# Patient Record
Sex: MALE | Race: BLACK OR AFRICAN AMERICAN | Employment: UNEMPLOYED | URBAN - METROPOLITAN AREA
[De-identification: names, ages, dates, MRNs, and addresses within clinical notes are randomized per-mention and may not be internally consistent; named-entity substitution may affect disease eponyms.]

---

## 2021-09-24 ENCOUNTER — OFFICE VISIT (OUTPATIENT)
Dept: PRIMARY CARE CLINIC | Age: 26
End: 2021-09-24
Payer: COMMERCIAL

## 2021-09-24 VITALS
HEIGHT: 67 IN | BODY MASS INDEX: 24.01 KG/M2 | TEMPERATURE: 97.9 F | DIASTOLIC BLOOD PRESSURE: 76 MMHG | WEIGHT: 153 LBS | HEART RATE: 101 BPM | OXYGEN SATURATION: 99 % | SYSTOLIC BLOOD PRESSURE: 115 MMHG
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
## 2021-09-24 DIAGNOSIS — M25.551 RIGHT HIP PAIN: ICD-10-CM

## 2021-09-24 PROCEDURE — G8420 CALC BMI NORM PARAMETERS: HCPCS | Performed by: FAMILY MEDICINE

## 2021-09-24 PROCEDURE — G8428 CUR MEDS NOT DOCUMENT: HCPCS | Performed by: FAMILY MEDICINE

## 2021-09-24 PROCEDURE — 99202 OFFICE O/P NEW SF 15 MIN: CPT | Performed by: FAMILY MEDICINE

## 2021-09-24 PROCEDURE — 4004F PT TOBACCO SCREEN RCVD TLK: CPT | Performed by: FAMILY MEDICINE

## 2021-09-24 ASSESSMENT — PATIENT HEALTH QUESTIONNAIRE - PHQ9
SUM OF ALL RESPONSES TO PHQ QUESTIONS 1-9: 0
SUM OF ALL RESPONSES TO PHQ9 QUESTIONS 1 & 2: 0
SUM OF ALL RESPONSES TO PHQ QUESTIONS 1-9: 0
1. LITTLE INTEREST OR PLEASURE IN DOING THINGS: 0
SUM OF ALL RESPONSES TO PHQ QUESTIONS 1-9: 0
2. FEELING DOWN, DEPRESSED OR HOPELESS: 0

## 2021-09-24 NOTE — PROGRESS NOTES
[address] [facility] PRIMARY CARE  [address] [place] [id number]  Dept: [phone]  Dept Fax: [phone]     9/24/2021      Merril Baumgarten   1995     Chief Complaint   Patient presents with    Fall     both legs -knee swollen/ bruising ( from jogging)       HPI  Pt comes in today for fall. Was running and doing exercises on Tuesday when he tripped and fell, landing on his knees and right side. Has abrasions to both knees. Right hip has continued to be painful. He is able to walk without difficulty. Has been out of work since Tuesday night.  at [employer]. Plans to return to work tomorrow. No data recorded       Prior to Visit Medications    Not on File        History reviewed. No pertinent past medical history. Social History     Tobacco Use    Smoking status: Current Every Day Smoker     Packs/day: 0.25     Types: Cigars    Smokeless tobacco: Never Used   Substance Use Topics    Alcohol use: Yes     Alcohol/week: 4.0 standard drinks     Types: 4 Cans of beer per week    Drug use: Never        History reviewed. No pertinent surgical history. Allergies   Allergen Reactions    Erythromycin Anaphylaxis     Throat swells         History reviewed. No pertinent family history. Patient's past medical history, surgical history, family history, medications, and allergies  were all reviewed and updated as appropriate today. Review of Systems   Constitutional: Negative for fever. Respiratory: Negative for cough and shortness of breath. Musculoskeletal: Positive for arthralgias. Negative for gait problem and joint swelling. Neurological: Negative for dizziness and syncope. /76   Pulse 101   Temp 97.9 °F (36.6 °C)   Ht 5' 7\" (1.702 m)   Wt 153 lb (69.4 kg)   SpO2 99%   BMI 23.96 kg/m²      Physical Exam  Vitals reviewed. Constitutional:       Appearance: Normal appearance. He is not ill-appearing.    Eyes:      General: No scleral icterus. Conjunctiva/sclera: Conjunctivae normal.   Cardiovascular:      Rate and Rhythm: Normal rate. Pulmonary:      Effort: Pulmonary effort is normal. No respiratory distress. Breath sounds: Normal breath sounds. Abdominal:      General: Abdomen is flat. Musculoskeletal:      Right lower leg: No edema. Left lower leg: No edema. Comments: Normal gait, right hip with normal non-painful ROM, + TTP/bruising over lateral aspect, + abrasions to BL anterior knees, healing well, no swelling   Skin:     General: Skin is warm and dry. Capillary Refill: Capillary refill takes less than 2 seconds. Neurological:      General: No focal deficit present. Mental Status: He is alert. Psychiatric:         Mood and Affect: Mood normal.         Assessment:  Encounter Diagnoses   Name Primary?  Fall from ground level Yes    Right hip pain        Plan:    - Recommended conservative management. Rest, NSAIDs, ice. Okay to return to work tomorrow. New Prescriptions    No medications on file        No orders of the defined types were placed in this encounter. Return if symptoms worsen or fail to improve. 20 total minutes were spent in direct patient care including chart review, face-to-face consultation and documentation.      [address], DO

## 2021-09-24 NOTE — PATIENT INSTRUCTIONS
Take OTC ibuprofen - 2 tablets by mouth before work and after your shift x 2-3 days. Ice your hip after work/activity. 20 minutes at a time.

## 2021-09-24 NOTE — LETTER
[address]  [address] [place] [address] Drive [address]  Phone: [phone]  Fax: [phone]    Kanu Jones DO        September 24, 2021     Patient: Kiya Ram   YOB: 1995   Date of Visit: 9/24/2021       To Whom It May Concern: It is my medical opinion that Kiya Ram may return to work on 9/25/21. If you have any questions or concerns, please don't hesitate to call.     Sincerely,        Gini Moser, DO

## 2021-09-28 ASSESSMENT — ENCOUNTER SYMPTOMS
COUGH: 0
SHORTNESS OF BREATH: 0

## 2022-09-18 ENCOUNTER — APPOINTMENT (OUTPATIENT)
Dept: CT IMAGING | Age: 27
DRG: 100 | End: 2022-09-18
Payer: MEDICAID

## 2022-09-18 ENCOUNTER — HOSPITAL ENCOUNTER (INPATIENT)
Age: 27
LOS: 2 days | Discharge: HOME | DRG: 100 | End: 2022-09-20
Attending: STUDENT IN AN ORGANIZED HEALTH CARE EDUCATION/TRAINING PROGRAM | Admitting: INTERNAL MEDICINE
Payer: MEDICAID

## 2022-09-18 ENCOUNTER — APPOINTMENT (OUTPATIENT)
Dept: MRI IMAGING | Age: 27
DRG: 100 | End: 2022-09-18
Payer: MEDICAID

## 2022-09-18 DIAGNOSIS — R56.9 SEIZURE (HCC): Primary | ICD-10-CM

## 2022-09-18 LAB
A/G RATIO: 1.6 (ref 1.1–2.2)
ALBUMIN SERPL-MCNC: 4.7 G/DL (ref 3.4–5)
ALP BLD-CCNC: 64 U/L (ref 40–129)
ALT SERPL-CCNC: 29 U/L (ref 10–40)
ANION GAP SERPL CALCULATED.3IONS-SCNC: 15 MMOL/L (ref 3–16)
AST SERPL-CCNC: 13 U/L (ref 15–37)
BASOPHILS ABSOLUTE: 0 K/UL (ref 0–0.2)
BASOPHILS RELATIVE PERCENT: 0.2 %
BILIRUB SERPL-MCNC: 0.6 MG/DL (ref 0–1)
BILIRUBIN URINE: NEGATIVE
BLOOD, URINE: NEGATIVE
BUN BLDV-MCNC: 11 MG/DL (ref 7–20)
CALCIUM SERPL-MCNC: 9.3 MG/DL (ref 8.3–10.6)
CHLORIDE BLD-SCNC: 97 MMOL/L (ref 99–110)
CLARITY: CLEAR
CO2: 25 MMOL/L (ref 21–32)
COLOR: YELLOW
CREAT SERPL-MCNC: 0.8 MG/DL (ref 0.9–1.3)
EOSINOPHILS ABSOLUTE: 0 K/UL (ref 0–0.6)
EOSINOPHILS RELATIVE PERCENT: 0.2 %
GFR AFRICAN AMERICAN: >60
GFR NON-AFRICAN AMERICAN: >60
GLUCOSE BLD-MCNC: 114 MG/DL (ref 70–99)
GLUCOSE BLD-MCNC: 125 MG/DL (ref 70–99)
GLUCOSE URINE: NEGATIVE MG/DL
HCT VFR BLD CALC: 48.7 % (ref 40.5–52.5)
HEMOGLOBIN: 16.7 G/DL (ref 13.5–17.5)
KETONES, URINE: >=80 MG/DL
LACTIC ACID: 2.2 MMOL/L (ref 0.4–2)
LEUKOCYTE ESTERASE, URINE: NEGATIVE
LYMPHOCYTES ABSOLUTE: 1 K/UL (ref 1–5.1)
LYMPHOCYTES RELATIVE PERCENT: 6.2 %
MCH RBC QN AUTO: 30.2 PG (ref 26–34)
MCHC RBC AUTO-ENTMCNC: 34.3 G/DL (ref 31–36)
MCV RBC AUTO: 87.8 FL (ref 80–100)
MICROSCOPIC EXAMINATION: ABNORMAL
MONOCYTES ABSOLUTE: 1.6 K/UL (ref 0–1.3)
MONOCYTES RELATIVE PERCENT: 10.5 %
NEUTROPHILS ABSOLUTE: 12.7 K/UL (ref 1.7–7.7)
NEUTROPHILS RELATIVE PERCENT: 82.9 %
NITRITE, URINE: NEGATIVE
PDW BLD-RTO: 13 % (ref 12.4–15.4)
PERFORMED ON: ABNORMAL
PH UA: 6.5 (ref 5–8)
PLATELET # BLD: 351 K/UL (ref 135–450)
PMV BLD AUTO: 7.2 FL (ref 5–10.5)
POTASSIUM REFLEX MAGNESIUM: 3.8 MMOL/L (ref 3.5–5.1)
PROTEIN UA: NEGATIVE MG/DL
RBC # BLD: 5.54 M/UL (ref 4.2–5.9)
SODIUM BLD-SCNC: 137 MMOL/L (ref 136–145)
SPECIFIC GRAVITY UA: 1.01 (ref 1–1.03)
TOTAL CK: 149 U/L (ref 39–308)
TOTAL PROTEIN: 7.7 G/DL (ref 6.4–8.2)
URINE REFLEX TO CULTURE: ABNORMAL
URINE TYPE: ABNORMAL
UROBILINOGEN, URINE: 2 E.U./DL
WBC # BLD: 15.4 K/UL (ref 4–11)

## 2022-09-18 PROCEDURE — 87040 BLOOD CULTURE FOR BACTERIA: CPT

## 2022-09-18 PROCEDURE — 70553 MRI BRAIN STEM W/O & W/DYE: CPT

## 2022-09-18 PROCEDURE — 93005 ELECTROCARDIOGRAM TRACING: CPT | Performed by: STUDENT IN AN ORGANIZED HEALTH CARE EDUCATION/TRAINING PROGRAM

## 2022-09-18 PROCEDURE — 6360000002 HC RX W HCPCS: Performed by: STUDENT IN AN ORGANIZED HEALTH CARE EDUCATION/TRAINING PROGRAM

## 2022-09-18 PROCEDURE — 99285 EMERGENCY DEPT VISIT HI MDM: CPT

## 2022-09-18 PROCEDURE — 96365 THER/PROPH/DIAG IV INF INIT: CPT

## 2022-09-18 PROCEDURE — 2060000000 HC ICU INTERMEDIATE R&B

## 2022-09-18 PROCEDURE — 80053 COMPREHEN METABOLIC PANEL: CPT

## 2022-09-18 PROCEDURE — 83605 ASSAY OF LACTIC ACID: CPT

## 2022-09-18 PROCEDURE — 6360000002 HC RX W HCPCS

## 2022-09-18 PROCEDURE — A9576 INJ PROHANCE MULTIPACK: HCPCS | Performed by: STUDENT IN AN ORGANIZED HEALTH CARE EDUCATION/TRAINING PROGRAM

## 2022-09-18 PROCEDURE — 70450 CT HEAD/BRAIN W/O DYE: CPT

## 2022-09-18 PROCEDURE — 85025 COMPLETE CBC W/AUTO DIFF WBC: CPT

## 2022-09-18 PROCEDURE — 82550 ASSAY OF CK (CPK): CPT

## 2022-09-18 PROCEDURE — 36415 COLL VENOUS BLD VENIPUNCTURE: CPT

## 2022-09-18 PROCEDURE — 96375 TX/PRO/DX INJ NEW DRUG ADDON: CPT

## 2022-09-18 PROCEDURE — 2580000003 HC RX 258: Performed by: STUDENT IN AN ORGANIZED HEALTH CARE EDUCATION/TRAINING PROGRAM

## 2022-09-18 PROCEDURE — 81003 URINALYSIS AUTO W/O SCOPE: CPT

## 2022-09-18 PROCEDURE — 6360000004 HC RX CONTRAST MEDICATION: Performed by: STUDENT IN AN ORGANIZED HEALTH CARE EDUCATION/TRAINING PROGRAM

## 2022-09-18 PROCEDURE — 2580000003 HC RX 258

## 2022-09-18 RX ORDER — DEXAMETHASONE SODIUM PHOSPHATE 4 MG/ML
4 INJECTION, SOLUTION INTRA-ARTICULAR; INTRALESIONAL; INTRAMUSCULAR; INTRAVENOUS; SOFT TISSUE ONCE
Status: COMPLETED | OUTPATIENT
Start: 2022-09-18 | End: 2022-09-18

## 2022-09-18 RX ORDER — ACETAMINOPHEN 325 MG/1
650 TABLET ORAL EVERY 6 HOURS PRN
Status: DISCONTINUED | OUTPATIENT
Start: 2022-09-18 | End: 2022-09-20 | Stop reason: HOSPADM

## 2022-09-18 RX ORDER — ENOXAPARIN SODIUM 100 MG/ML
40 INJECTION SUBCUTANEOUS DAILY
Status: DISCONTINUED | OUTPATIENT
Start: 2022-09-19 | End: 2022-09-20 | Stop reason: HOSPADM

## 2022-09-18 RX ORDER — POLYETHYLENE GLYCOL 3350 17 G/17G
17 POWDER, FOR SOLUTION ORAL DAILY PRN
Status: DISCONTINUED | OUTPATIENT
Start: 2022-09-18 | End: 2022-09-20 | Stop reason: HOSPADM

## 2022-09-18 RX ORDER — ONDANSETRON 4 MG/1
4 TABLET, ORALLY DISINTEGRATING ORAL EVERY 8 HOURS PRN
Status: DISCONTINUED | OUTPATIENT
Start: 2022-09-18 | End: 2022-09-20 | Stop reason: HOSPADM

## 2022-09-18 RX ORDER — SODIUM CHLORIDE 9 MG/ML
INJECTION, SOLUTION INTRAVENOUS PRN
Status: DISCONTINUED | OUTPATIENT
Start: 2022-09-18 | End: 2022-09-20 | Stop reason: HOSPADM

## 2022-09-18 RX ORDER — ACETAMINOPHEN 650 MG/1
650 SUPPOSITORY RECTAL EVERY 6 HOURS PRN
Status: DISCONTINUED | OUTPATIENT
Start: 2022-09-18 | End: 2022-09-20 | Stop reason: HOSPADM

## 2022-09-18 RX ORDER — ONDANSETRON 2 MG/ML
4 INJECTION INTRAMUSCULAR; INTRAVENOUS EVERY 6 HOURS PRN
Status: DISCONTINUED | OUTPATIENT
Start: 2022-09-18 | End: 2022-09-20 | Stop reason: HOSPADM

## 2022-09-18 RX ORDER — SODIUM CHLORIDE 0.9 % (FLUSH) 0.9 %
5-40 SYRINGE (ML) INJECTION EVERY 12 HOURS SCHEDULED
Status: DISCONTINUED | OUTPATIENT
Start: 2022-09-18 | End: 2022-09-20 | Stop reason: HOSPADM

## 2022-09-18 RX ORDER — SODIUM CHLORIDE 9 MG/ML
INJECTION, SOLUTION INTRAVENOUS CONTINUOUS
Status: ACTIVE | OUTPATIENT
Start: 2022-09-18 | End: 2022-09-18

## 2022-09-18 RX ORDER — SODIUM CHLORIDE 0.9 % (FLUSH) 0.9 %
5-40 SYRINGE (ML) INJECTION PRN
Status: DISCONTINUED | OUTPATIENT
Start: 2022-09-18 | End: 2022-09-20 | Stop reason: HOSPADM

## 2022-09-18 RX ADMIN — GADOTERIDOL 16 ML: 279.3 INJECTION, SOLUTION INTRAVENOUS at 15:03

## 2022-09-18 RX ADMIN — SODIUM CHLORIDE: 9 INJECTION, SOLUTION INTRAVENOUS at 18:49

## 2022-09-18 RX ADMIN — DEXAMETHASONE SODIUM PHOSPHATE 4 MG: 4 INJECTION, SOLUTION INTRAMUSCULAR; INTRAVENOUS at 13:55

## 2022-09-18 RX ADMIN — CEFTRIAXONE 2000 MG: 2 INJECTION, POWDER, FOR SOLUTION INTRAMUSCULAR; INTRAVENOUS at 18:55

## 2022-09-18 RX ADMIN — SODIUM CHLORIDE 1500 MG: 900 INJECTION, SOLUTION INTRAVENOUS at 14:09

## 2022-09-18 RX ADMIN — SODIUM CHLORIDE, PRESERVATIVE FREE 10 ML: 5 INJECTION INTRAVENOUS at 21:09

## 2022-09-18 ASSESSMENT — ENCOUNTER SYMPTOMS
CONSTIPATION: 0
BACK PAIN: 0
ABDOMINAL PAIN: 0
DIARRHEA: 0
NAUSEA: 0
SHORTNESS OF BREATH: 0
BLOOD IN STOOL: 0
PHOTOPHOBIA: 1
RECTAL PAIN: 0
SORE THROAT: 0
VOMITING: 0
EYE PAIN: 0
SINUS PRESSURE: 1
APNEA: 0
PHOTOPHOBIA: 0

## 2022-09-18 ASSESSMENT — PAIN SCALES - GENERAL: PAINLEVEL_OUTOF10: 7

## 2022-09-18 NOTE — ED NOTES
Pt back from MRI. No change in condition. Pt remains stable. Resident physician at bedside at this time speaking with pt.      Sukhwinder Bentley RN  09/18/22 [id number]

## 2022-09-18 NOTE — PROGRESS NOTES
4 Eyes Admission Assessment     I agree as the admission nurse that 2 RN's have performed a thorough Head to Toe Skin Assessment on the patient. ALL assessment sites listed below have been assessed on admission. Areas assessed by both nurses:   [x]   Head, Face, and Ears   [x]   Shoulders, Back, and Chest  [x]   Arms, Elbows, and Hands   [x]   Coccyx, Sacrum, and Ischium  [x]   Legs, Feet, and Heels        Does the Patient have Skin Breakdown?   No         Richard Prevention initiated:  NA   Wound Care Orders initiated:  NA      WOC nurse consulted for Pressure Injury (Stage 3,4, Unstageable, DTI, NWPT, and Complex wounds) or Richard score 18 or lower:  NA      Nurse 1 eSignature: Electronically signed by Felix Zavaleta RN on 9/18/22 at 5:58 PM EDT    **SHARE this note so that the co-signing nurse is able to place an eSignature**    Nurse 2 eSignature: Electronically signed by Varsha Guzman RN on 9/18/22 at 6:07 PM EDT

## 2022-09-18 NOTE — H&P
Internal Medicine  PGY 1  History & Physical      CC seizure    History Obtained From:  patient    HISTORY OF PRESENT ILLNESS:  Pt is a 26yo M with no relevant PMHx who presents with new onset seizure this morning. Pt states he was laying down in his bed and having a conversation with his girlfriend and son. Mid-conversation, patient's eyes rolled back into his head and he started foaming at the mouth. He was making arm movements as if he was grabbing a water bottle. This episode lasted about 2 to 3 minutes. Pt describes post-ictal state as sleepy, however he could hear his girlfriend calling 911. He was able to get up and out of bed, get dressed, and get on the ambulance. Pt states he has had sinus congestion, HA, and L ear pain over the last 5 days with associated dizziness and photophobia. He has had poor PO intake over the last 1 week, however he is drinking broth/soups and lots of water. He has been taking Tylenol and CVC brand acetaminophen over the last 3 days. Additionally, he bought lavendar essential oil and ear drops on [employer], which he has used since yesterday. He describes a possible seizure 4 days ago during which he was sleeping on the couch and his girlfriend heard a loud sound, and found the patient still sleeping but with blood under his nose. He does not remember this episode. Denies fever, chills, sweats, vision changes, sore throat, CP, SOB, abd pain, N/V, peripheral edema. In the ED, pt was worked up for seizure. He was given decadron 4mg x1 and Keppra 1.5g BID. CT head significant for \"Multifocal areas of presumed edema with linear hemorrhagic staining throughout the cerebrum. \" Neurosurgery was consulted who recommended MRI head w and wo, which was significant for \"hyperintensity in the right frontal lobe with associated pachymeningeal enhancement as well as subtle regions of T2 hyperintensity in the inferior bilateral temporal lobes\" likely acute inflammatory or infectious process.  GCS 15. WBC elevated to 15. Glucose 125. Labs otherwise unremarkable. Past Medical History:    No past medical history on file. Past Surgical History:    No past surgical history on file. Medications Priorto Admission:    Not in a hospital admission. Allergies:  Erythromycin    Social History:   TOBACCO:   reports that he has been smoking cigars and cigarettes. He has been smoking an average of .25 packs per day. He has never used smokeless tobacco.  ETOH:  for the last several months, drank 3-4 bottles of beer daily. Stopped drinking 1 week ago. DRUGS : medical marijuana for osteoarthritis  Patient currently lives with family, girlfriend and 11mo old son    Family History:   No family history on file. Review of Systems   Constitutional:  Negative for appetite change (decreased), chills and fever. HENT:  Positive for ear pain (L ear) and sinus pressure. Negative for sore throat. Eyes:  Positive for photophobia. Negative for pain. Respiratory:  Negative for shortness of breath. Cardiovascular:  Negative for chest pain and palpitations. Gastrointestinal:  Negative for abdominal pain, constipation, diarrhea, nausea and vomiting. Genitourinary:  Negative for difficulty urinating. Musculoskeletal:  Negative for neck pain and neck stiffness. Skin:  Negative for rash. Neurological:  Positive for dizziness, seizures and headaches. Physical Exam  Constitutional:       General: He is not in acute distress. Appearance: Normal appearance. He is not ill-appearing. HENT:      Head: Normocephalic and atraumatic. Mouth/Throat:      Mouth: Mucous membranes are moist.   Eyes:      Pupils: Pupils are equal, round, and reactive to light. Cardiovascular:      Rate and Rhythm: Normal rate and regular rhythm. Pulmonary:      Effort: Pulmonary effort is normal.      Breath sounds: Normal breath sounds. Abdominal:      General: Abdomen is flat.  Bowel sounds are normal. There is no laminar necrosis. Neoplastic process is felt to be less likely. CT Head W/O Contrast   Final Result      Multifocal areas of presumed edema with linear hemorrhagic staining throughout the cerebrum. Further evaluation with contrast-enhanced MRI as warranted. Findings discussed with Dr. Chani Leiva in the emergency room on 9/18/2022 at [phone]. ASSESSMENT AND PLAN:  Pt is a 24yo M with no relevant PMHx who presents with new onset seizure this morning with sx of sinus congestion, HA, and L ear pain over the last 5 days with associated dizziness and photophobia. He is admitted for management of seizure. New onset seizure  Pt with new seizure this AM. No focal residual deficits and GCS 15 at presentation. Given recent infectious sx of L ear pain, congestion, HA, considering infectious encephalitis. Meningitis less likely given negative Kernig's sign. Considering medication induced as pt tried new ear drops from . CT head: Multifocal areas of presumed edema with linear hemorrhagic staining throughout the cerebrum. MRI head w wo: hyperintensity in the right frontal lobe with associated pachymeningeal enhancement as well as subtle regions of T2 hyperintensity in the inferior bilateral temporal lobes; Differential diagnosis is broad and favored to represent an acute inflammatory or infectious process such as cerebritis, encephalitis.   - Q4hr neuro checks  - cont 500mg keppra BID IV  - neurology consulted - appreciate recs  - considering LP?  - neurosurgery consulted - appreciate recs  - f/u UA, urine culture, UDS  - f/u lactate  - trend CK daily x 3 days  - pain control with Tylenol    Will discuss with attending physician Dr. Jennifer Myers Status:Full code  FEN: NPO  PPX: SCDs  DISPO: Nya Umanzor MD PGY1  9/18/2022,  4:00 PM

## 2022-09-18 NOTE — PROGRESS NOTES
Patient transferred and oriented to room 5511. Pt denies pain, nausea, and photophobia at this time.  Siderails up x4, bed wheels locked

## 2022-09-18 NOTE — PROGRESS NOTES
Original order for Acyclovir 10 mg per kg (=750 mg based on actual body wt = 73 kg) IV q8h, changed to 10 mg per kg (=650 mg based on ideal body wt = 66 kg) IV q8h per renal dosing protocol. Use ideal body weight or adjusted body weight for weight-based dosing in patients with obesity to avoid overdosing and subsequent toxicity (eg, acute renal failure).   Wicho BallOP 9/18/2022 5:18 PM

## 2022-09-18 NOTE — ED NOTES
Provider at bedside updating pt and significant other at this time.      Patt Jacobs RN  09/18/22 4037

## 2022-09-18 NOTE — ED PROVIDER NOTES
[address] [facility]          ATTENDING PHYSICIAN NOTE       Date of evaluation: 9/18/2022    Chief Complaint     Seizures      History of Present Illness     Chuy Greco is a 31 y.o. male with no significant underlying medical history who presents to the hospital today for evaluation of a seizure. The patient states that he had a seizure about an hour prior to arrival when he was sleeping. He states that he has otherwise been dealing with some sinus congestion and left-sided ear pain for the past 5 days. He has been taking over-the-counter medications and some lavender oil for his symptoms. He denies any other new medications. He has never had seizures before and denies any family history of seizures. Patient denies any recent fever. He has not had any chest pain abdominal pain, nausea vomiting. Patient denies any lateral symptoms and endorses a mild headache at this time. He last took some Tylenol for his headache around 9 AM today. He denies any photophobia or migraine-like symptoms at this time. Review of Systems     Review of Systems   HENT:  Negative for congestion and ear discharge. Eyes:  Negative for photophobia and visual disturbance. Respiratory:  Negative for apnea. Cardiovascular:  Negative for chest pain. Gastrointestinal:  Negative for abdominal pain, blood in stool, nausea, rectal pain and vomiting. Endocrine: Negative for polydipsia. Genitourinary:  Negative for difficulty urinating. Musculoskeletal:  Negative for back pain. Skin:  Negative for pallor. Neurological:  Positive for seizures. Negative for dizziness and syncope. Hematological:  Negative for adenopathy. All other systems reviewed and are negative. Past Medical, Surgical, Family, and Social History     He has no past medical history on file. He has no past surgical history on file. His family history is not on file.   He reports that he has been smoking cigars and cigarettes. He has been smoking an average of .25 packs per day. He has never used smokeless tobacco. He reports current drug use. Drug: Marijuana Charmayne Stai). Medications     Previous Medications    No medications on file       Allergies     He is allergic to erythromycin. Physical Exam     INITIAL VITALS: BP: (!) 129/101, Temp: 98.6 °F (37 °C), Heart Rate: 60, Resp: 18, SpO2: 99 %   Physical Exam  Vitals reviewed. Constitutional:       General: He is not in acute distress. Appearance: Normal appearance. He is well-developed. He is not ill-appearing, toxic-appearing or diaphoretic. HENT:      Head: Normocephalic and atraumatic. Nose: Nose normal.      Mouth/Throat:      Mouth: Mucous membranes are moist.   Eyes:      Pupils: Pupils are equal, round, and reactive to light. Cardiovascular:      Rate and Rhythm: Normal rate and regular rhythm. Heart sounds: Normal heart sounds. Pulmonary:      Effort: Pulmonary effort is normal.      Breath sounds: Normal breath sounds. Abdominal:      General: Bowel sounds are normal. There is no distension. Palpations: Abdomen is soft. Tenderness: There is no abdominal tenderness. Musculoskeletal:         General: No swelling, tenderness or deformity. Normal range of motion. Cervical back: Neck supple. Skin:     General: Skin is warm and dry. Capillary Refill: Capillary refill takes less than 2 seconds. Neurological:      General: No focal deficit present. Mental Status: He is alert and oriented to person, place, and time. Cranial Nerves: No cranial nerve deficit. Sensory: No sensory deficit. Motor: No weakness. Coordination: Coordination normal.      Gait: Gait normal.      Deep Tendon Reflexes: Reflexes normal.   Psychiatric:         Mood and Affect: Mood normal.       Diagnostic Results     EKG   Indication seizure.     RADIOLOGY:  CT Head W/O Contrast   Final Result      Multifocal areas of presumed edema with linear hemorrhagic staining throughout the cerebrum. Further evaluation with contrast-enhanced MRI as warranted. Findings discussed with Dr. Chani Leiva in the emergency room on 9/18/2022 at [phone]. MRI BRAIN W WO CONTRAST    (Results Pending)       LABS:   Results for orders placed or performed during the hospital encounter of 09/18/22   CBC with Auto Differential   Result Value Ref Range    WBC 15.4 (H) 4.0 - 11.0 K/uL    RBC 5.54 4.20 - 5.90 M/uL    Hemoglobin 16.7 13.5 - 17.5 g/dL    Hematocrit 48.7 40.5 - 52.5 %    MCV 87.8 80.0 - 100.0 fL    MCH 30.2 26.0 - 34.0 pg    MCHC 34.3 31.0 - 36.0 g/dL    RDW 13.0 12.4 - 15.4 %    Platelets 206 940 - 424 K/uL    MPV 7.2 5.0 - 10.5 fL    Neutrophils % 82.9 %    Lymphocytes % 6.2 %    Monocytes % 10.5 %    Eosinophils % 0.2 %    Basophils % 0.2 %    Neutrophils Absolute 12.7 (H) 1.7 - 7.7 K/uL    Lymphocytes Absolute 1.0 1.0 - 5.1 K/uL    Monocytes Absolute 1.6 (H) 0.0 - 1.3 K/uL    Eosinophils Absolute 0.0 0.0 - 0.6 K/uL    Basophils Absolute 0.0 0.0 - 0.2 K/uL   CMP w/ Reflex to MG   Result Value Ref Range    Sodium 137 136 - 145 mmol/L    Potassium reflex Magnesium 3.8 3.5 - 5.1 mmol/L    Chloride 97 (L) 99 - 110 mmol/L    CO2 25 21 - 32 mmol/L    Anion Gap 15 3 - 16    Glucose 114 (H) 70 - 99 mg/dL    BUN 11 7 - 20 mg/dL    Creatinine 0.8 (L) 0.9 - 1.3 mg/dL    GFR Non-African American >60 >60    GFR African American >60 >60    Calcium 9.3 8.3 - 10.6 mg/dL    Total Protein 7.7 6.4 - 8.2 g/dL    Albumin 4.7 3.4 - 5.0 g/dL    Albumin/Globulin Ratio 1.6 1.1 - 2.2    Total Bilirubin 0.6 0.0 - 1.0 mg/dL    Alkaline Phosphatase 64 40 - 129 U/L    ALT 29 10 - 40 U/L    AST 13 (L) 15 - 37 U/L   POCT Glucose   Result Value Ref Range    POC Glucose 125 (H) 70 - 99 mg/dl    Performed on ACCU-CHEK        ED BEDSIDE ULTRASOUND:  No results found.     RECENT VITALS:  BP: 135/89,Temp: 98.6 °F (37 °C), Heart Rate: 57, Resp: 17, SpO2: 100 %     Procedures       ED Course Nursing Notes, Past Medical Hx, Past Surgical Hx, Social Hx,Allergies, and Family Hx were reviewed. ED Course as of 09/18/22 1417   Sun Sep 18, 2022   4998 The patient significant other came back and stated that he was talking with her when he started foaming out of his mouth and his eyes rolled back and had a generalized tonic-clonic seizure for about a minute. The patient was postictal after the event happened. Here in the department he has some mild leukocytosis but no significant neutrophilia and his CMP shows no evidence of electrolyte derangements. His glucose was 125 in the department. [EI]      ED Course User Index  [EI] Gerson Diaz MD       patient was given the following medications:  Orders Placed This Encounter   Medications    dexamethasone (DECADRON) injection 4 mg    levETIRAcetam (KEPPRA) 1,500 mg in sodium chloride 0.9 % 100 mL IVPB       CONSULTS:  IP CONSULT TO NEUROSURGERY  IP CONSULT TO HOSPITALIST    MEDICAL DECISIONMAKING / ASSESSMENT / Heribertoministerio Layne is a 31 y.o. male with no significant seizure history presenting with a new onset seizure. Patient has findings suggestive of vasogenic edema throughout his cerebrum with hemorrhagic streaking that is appreciated and is new. Given this he was given steroids and antiepileptic medications. I discussed his case with neurosurgery who agrees with MRI and admission to the hospitalist.  The patient this time has no obvious focal deficits and will be admitted to the hospital.      Clinical Impression     1. Seizure ([address]        Disposition     PATIENT REFERRED TO:  No follow-up provider specified.     DISCHARGE MEDICATIONS:  New Prescriptions    No medications on file       DISPOSITION Decision To Admit 09/18/2022 02:06:51 PM         Gerson Diaz MD  09/18/22 1413

## 2022-09-18 NOTE — ED NOTES
Pt to MRI via wheelchair after Keppra infusion completed. NO change in condition.      Herbert Norton RN  09/18/22 5440

## 2022-09-18 NOTE — ED TRIAGE NOTES
Pt to ed from home for reported by girlfriend he had a seizure this am. Pt states he has been taking over the counter meds for vertigo recently but does not know the names.

## 2022-09-18 NOTE — ED NOTES
Pt continues resting in bed at this time. No change in condition. Seizure precautions remain in place. Call light remains in reach.      Celeste Brink RN  09/18/22 [id number]

## 2022-09-19 ENCOUNTER — APPOINTMENT (OUTPATIENT)
Dept: GENERAL RADIOLOGY | Age: 27
DRG: 100 | End: 2022-09-19
Payer: MEDICAID

## 2022-09-19 LAB
ANION GAP SERPL CALCULATED.3IONS-SCNC: 12 MMOL/L (ref 3–16)
APPEARANCE CSF: CLEAR
BASOPHILS ABSOLUTE: 0.1 K/UL (ref 0–0.2)
BASOPHILS RELATIVE PERCENT: 0.7 %
BUN BLDV-MCNC: 10 MG/DL (ref 7–20)
CALCIUM SERPL-MCNC: 9.4 MG/DL (ref 8.3–10.6)
CHLORIDE BLD-SCNC: 99 MMOL/L (ref 99–110)
CLOT EVALUATION CSF: ABNORMAL
CO2: 29 MMOL/L (ref 21–32)
COLOR CSF: YELLOW
CREAT SERPL-MCNC: 0.9 MG/DL (ref 0.9–1.3)
EKG ATRIAL RATE: 64 BPM
EKG DIAGNOSIS: NORMAL
EKG P AXIS: 67 DEGREES
EKG P-R INTERVAL: 118 MS
EKG Q-T INTERVAL: 416 MS
EKG QRS DURATION: 102 MS
EKG QTC CALCULATION (BAZETT): 429 MS
EKG R AXIS: -19 DEGREES
EKG T AXIS: 40 DEGREES
EKG VENTRICULAR RATE: 64 BPM
EOS CSF: 2 %
EOSINOPHILS ABSOLUTE: 0.1 K/UL (ref 0–0.6)
EOSINOPHILS RELATIVE PERCENT: 0.6 %
GFR AFRICAN AMERICAN: >60
GFR NON-AFRICAN AMERICAN: >60
GLUCOSE BLD-MCNC: 95 MG/DL (ref 70–99)
GLUCOSE, CSF: 65 MG/DL (ref 40–80)
HAV IGM SER IA-ACNC: NORMAL
HCT VFR BLD CALC: 44.1 % (ref 40.5–52.5)
HEMOGLOBIN: 15.3 G/DL (ref 13.5–17.5)
HEPATITIS B CORE IGM ANTIBODY: NORMAL
HEPATITIS B SURFACE ANTIGEN INTERPRETATION: NORMAL
HEPATITIS C ANTIBODY INTERPRETATION: NORMAL
INR BLD: 1.05 (ref 0.87–1.14)
LYMPHOCYTES ABSOLUTE: 2.3 K/UL (ref 1–5.1)
LYMPHOCYTES RELATIVE PERCENT: 18.5 %
LYMPHS CSF: 45 % (ref 40–80)
MACROPHAGE, CSF: 19 %
MCH RBC QN AUTO: 30.2 PG (ref 26–34)
MCHC RBC AUTO-ENTMCNC: 34.8 G/DL (ref 31–36)
MCV RBC AUTO: 87 FL (ref 80–100)
MENINGITIS ENCEPHALITIS PANEL: NORMAL
MONOCYTES ABSOLUTE: 1.7 K/UL (ref 0–1.3)
MONOCYTES RELATIVE PERCENT: 13.5 %
NEUTROPHILS ABSOLUTE: 8.4 K/UL (ref 1.7–7.7)
NEUTROPHILS RELATIVE PERCENT: 66.7 %
NEUTROPHILS, CSF: 34 % (ref 0–6)
PDW BLD-RTO: 12.8 % (ref 12.4–15.4)
PLATELET # BLD: 312 K/UL (ref 135–450)
PMV BLD AUTO: 7.2 FL (ref 5–10.5)
POTASSIUM REFLEX MAGNESIUM: 4.3 MMOL/L (ref 3.5–5.1)
PROTEIN CSF: 53 MG/DL (ref 15–45)
PROTHROMBIN TIME: 13.7 SEC (ref 11.7–14.5)
RBC # BLD: 5.07 M/UL (ref 4.2–5.9)
RBC CSF: 1700 /CUMM
REPORT: NORMAL
SODIUM BLD-SCNC: 140 MMOL/L (ref 136–145)
TOTAL CK: 134 U/L (ref 39–308)
TUBE NUMBER CSF: ABNORMAL
TUBE NUMBER CSF: NORMAL
VOLUME CSF: 1 ML
WBC # BLD: 12.6 K/UL (ref 4–11)
WBC CSF: 26 /CUMM (ref 0–5)

## 2022-09-19 PROCEDURE — 84157 ASSAY OF PROTEIN OTHER: CPT

## 2022-09-19 PROCEDURE — 95819 EEG AWAKE AND ASLEEP: CPT

## 2022-09-19 PROCEDURE — 2500000003 HC RX 250 WO HCPCS

## 2022-09-19 PROCEDURE — 80048 BASIC METABOLIC PNL TOTAL CA: CPT

## 2022-09-19 PROCEDURE — 82550 ASSAY OF CK (CPK): CPT

## 2022-09-19 PROCEDURE — 87390 HIV-1 AG IA: CPT

## 2022-09-19 PROCEDURE — 62328 DX LMBR SPI PNXR W/FLUOR/CT: CPT

## 2022-09-19 PROCEDURE — 009U3ZX DRAINAGE OF SPINAL CANAL, PERCUTANEOUS APPROACH, DIAGNOSTIC: ICD-10-PCS | Performed by: RADIOLOGY

## 2022-09-19 PROCEDURE — 6360000002 HC RX W HCPCS

## 2022-09-19 PROCEDURE — 82945 GLUCOSE OTHER FLUID: CPT

## 2022-09-19 PROCEDURE — 86701 HIV-1ANTIBODY: CPT

## 2022-09-19 PROCEDURE — 85025 COMPLETE CBC W/AUTO DIFF WBC: CPT

## 2022-09-19 PROCEDURE — 85610 PROTHROMBIN TIME: CPT

## 2022-09-19 PROCEDURE — 2580000003 HC RX 258

## 2022-09-19 PROCEDURE — APPNB180 APP NON BILLABLE TIME > 60 MINS

## 2022-09-19 PROCEDURE — 2060000000 HC ICU INTERMEDIATE R&B

## 2022-09-19 PROCEDURE — 87070 CULTURE OTHR SPECIMN AEROBIC: CPT

## 2022-09-19 PROCEDURE — 87483 CNS DNA AMP PROBE TYPE 12-25: CPT

## 2022-09-19 PROCEDURE — 86780 TREPONEMA PALLIDUM: CPT

## 2022-09-19 PROCEDURE — 36415 COLL VENOUS BLD VENIPUNCTURE: CPT

## 2022-09-19 PROCEDURE — 80074 ACUTE HEPATITIS PANEL: CPT

## 2022-09-19 PROCEDURE — 86702 HIV-2 ANTIBODY: CPT

## 2022-09-19 PROCEDURE — 89050 BODY FLUID CELL COUNT: CPT

## 2022-09-19 PROCEDURE — 6370000000 HC RX 637 (ALT 250 FOR IP)

## 2022-09-19 PROCEDURE — 87205 SMEAR GRAM STAIN: CPT

## 2022-09-19 RX ORDER — LIDOCAINE HYDROCHLORIDE 10 MG/ML
5 INJECTION, SOLUTION INFILTRATION; PERINEURAL ONCE
Status: DISCONTINUED | OUTPATIENT
Start: 2022-09-19 | End: 2022-09-19 | Stop reason: CLARIF

## 2022-09-19 RX ADMIN — ACYCLOVIR SODIUM 650 MG: 50 INJECTION, SOLUTION INTRAVENOUS at 11:04

## 2022-09-19 RX ADMIN — SODIUM CHLORIDE: 9 INJECTION, SOLUTION INTRAVENOUS at 13:57

## 2022-09-19 RX ADMIN — CEFTRIAXONE 2000 MG: 2 INJECTION, POWDER, FOR SOLUTION INTRAMUSCULAR; INTRAVENOUS at 17:29

## 2022-09-19 RX ADMIN — SODIUM CHLORIDE: 9 INJECTION, SOLUTION INTRAVENOUS at 01:38

## 2022-09-19 RX ADMIN — LIDOCAINE HYDROCHLORIDE 5 ML: 10 INJECTION, SOLUTION INFILTRATION; PERINEURAL at 09:54

## 2022-09-19 RX ADMIN — SODIUM CHLORIDE 1000 MG: 9 INJECTION, SOLUTION INTRAVENOUS at 01:39

## 2022-09-19 RX ADMIN — ACYCLOVIR SODIUM 650 MG: 50 INJECTION, SOLUTION INTRAVENOUS at 02:20

## 2022-09-19 RX ADMIN — ACYCLOVIR SODIUM 650 MG: 50 INJECTION, SOLUTION INTRAVENOUS at 18:07

## 2022-09-19 RX ADMIN — ACETAMINOPHEN 650 MG: 325 TABLET, FILM COATED ORAL at 18:42

## 2022-09-19 RX ADMIN — SODIUM CHLORIDE 1000 MG: 9 INJECTION, SOLUTION INTRAVENOUS at 13:58

## 2022-09-19 RX ADMIN — SODIUM CHLORIDE: 9 INJECTION, SOLUTION INTRAVENOUS at 02:20

## 2022-09-19 RX ADMIN — CEFTRIAXONE 2000 MG: 2 INJECTION, POWDER, FOR SOLUTION INTRAMUSCULAR; INTRAVENOUS at 05:30

## 2022-09-19 ASSESSMENT — PAIN SCALES - GENERAL
PAINLEVEL_OUTOF10: 5
PAINLEVEL_OUTOF10: 0

## 2022-09-19 NOTE — PROGRESS NOTES
Physician Progress Note      PATIENT:               Nanda Garcia  CSN #:                  [id number]  :                       1995  ADMIT DATE:       2022 12:06 PM  DISCH DATE:  RESPONDING  PROVIDER #:        Pam Loera          QUERY TEXT:    Pt admitted with presumed seizure. Per CT Head, pt noted to have vasogenic   edema and T2/FLAIR hyperintensity in the right frontal lobe with associated   pachymeningeal enhancement. If clinically significant, please document in   progress notes and discharge summary if you are evaluating/treating any of the   following: The medical record reflects the following:  Risk Factors: 31 y.o. male with no significant PMH  Clinical Indicators: Presented with new onset seizure associated with   dizziness and photophobia  Treatment: Neurology consult, Imaging, One time dose of decadron in ED,   Keppra, q 4 hr neuro checks  Options provided:  -- Cerebral edema  -- Cerebral edema ruled out  -- Other - I will add my own diagnosis  -- Disagree - Not applicable / Not valid  -- Disagree - Clinically unable to determine / Unknown  -- Refer to Clinical Documentation Reviewer    PROVIDER RESPONSE TEXT:    This patient has cerebral edema.     Query created by: Yee Keating on 2022 3:51 PM      Electronically signed by:  Pam Loera 2022 4:26 PM

## 2022-09-19 NOTE — PROGRESS NOTES
Pt A&Ox4, VSS on room air. No acute neuro changes at this time. No new seizure activity this shift. Pt is up as tolerated, SBA. Voiding adequately via BRP. Pt adequately eating and drinking. Pt complains of dizziness upon standing. All fall and seizure precautions in place. No further needs at this time. Will continue to monitor.

## 2022-09-19 NOTE — PROGRESS NOTES
cerebrum. Neurosurgery was consulted and recommended an MRI of his head with and without contrast.  This study notable for significant hyperintensity in the right frontal lobe with associated pachymeningeal enhancement as well as subtle regions of T2 hyperintensity of the inferior bilateral temporal lobes. These results are likely acute inflammatory infectious process. Terry Coma Scale in the ED was 15. White blood cell abated 15. Glucose at 125. Labs otherwise unremarkable. Medications:     Scheduled Meds:   sodium chloride flush  5-40 mL IntraVENous 2 times per day    enoxaparin  40 mg SubCUTAneous Daily    levETIRAcetam  1,000 mg IntraVENous Q12H    cefTRIAXone (ROCEPHIN) IV  2,000 mg IntraVENous Q12H    acyclovir  10 mg/kg (Ideal) IntraVENous Q8H     Continuous Infusions:   sodium chloride 25 mL/hr at 09/19/22 0220     PRN Meds:sodium chloride flush, sodium chloride, ondansetron **OR** ondansetron, polyethylene glycol, acetaminophen **OR** acetaminophen    Objective:   Vitals:   T-max:  Patient Vitals for the past 8 hrs:   BP Temp Temp src Pulse Resp SpO2   09/19/22 0643 120/78 97.9 °F (36.6 °C) Oral 59 16 98 %   09/19/22 0215 113/67 98.7 °F (37.1 °C) Oral 59 16 97 %       Intake/Output Summary (Last 24 hours) at 9/19/2022 9761  Last data filed at 9/18/2022 1426  Gross per 24 hour   Intake 100 ml   Output --   Net 100 ml       Physical Exam  Constitutional:       General: He is awake. He is not in acute distress. Appearance: He is normal weight. Eyes:      Extraocular Movements: Extraocular movements intact. Cardiovascular:      Rate and Rhythm: Normal rate and regular rhythm. Pulses:           Radial pulses are 2+ on the right side and 2+ on the left side. Heart sounds: Normal heart sounds, S1 normal and S2 normal. No murmur heard. Pulmonary:      Effort: Pulmonary effort is normal.      Breath sounds: Normal breath sounds and air entry. Abdominal:      General: Abdomen is flat. Palpations: Abdomen is soft. Tenderness: There is no abdominal tenderness. Musculoskeletal:      Right lower leg: No edema. Left lower leg: No edema. Neurological:      Mental Status: He is alert and oriented to person, place, and time. Psychiatric:         Behavior: Behavior is cooperative. LABS:    CBC:   Recent Labs     09/18/22  1250 09/19/22  0607   WBC 15.4* 12.6*   HGB 16.7 15.3   HCT 48.7 44.1    312   MCV 87.8 87.0     Renal:    Recent Labs     09/18/22  1250 09/19/22  0606    140   K 3.8 4.3   CL 97* 99   CO2 25 29   BUN 11 10   CREATININE 0.8* 0.9   GLUCOSE 114* 95   CALCIUM 9.3 9.4   ANIONGAP 15 12     Hepatic:   Recent Labs     09/18/22  1250   AST 13*   ALT 29   BILITOT 0.6   PROT 7.7   LABALBU 4.7   ALKPHOS 64     Troponin: No results for input(s): TROPONINI in the last 72 hours. BNP: No results for input(s): BNP in the last 72 hours. Lipids: No results for input(s): CHOL, HDL in the last 72 hours. Invalid input(s): LDLCALCU, TRIGLYCERIDE  ABGs:  No results for input(s): PHART, ITQ1EKH, PO2ART, OFR4MAB, BEART, THGBART, S2UARTVX, RSI2LDZ in the last 72 hours. INR:   Recent Labs     09/19/22  0607   INR 1.05     Lactate: No results for input(s): LACTATE in the last 72 hours. Cultures:  -----------------------------------------------------------------  RAD:   MRI BRAIN W WO CONTRAST   Final Result   Addendum (preliminary) 1 of 1   ADDENDUM #1    Upon review of the CT images, there is small amount of intracranial gas    along the inner table of the left temporal bone. There is subtle fracture    associated with the squamous portion of the left temporal bone. Up-to-date findings discussed with Dr. Rea Mclaughlin in the emergency room on    9/18/2022 at [phone].       Final   Impression:       T2/FLAIR hyperintensity in the right frontal lobe with associated pachymeningeal enhancement as well as subtle regions of T2 hyperintensity in the inferior bilateral temporal lobes. Differential diagnosis is broad and favored to represent an acute inflammatory or infectious process such as cerebritis, encephalitis. Ischemic process is felt to be less likely but there is some susceptibility artifact in the region of interest that could represent some amount of cortical laminar necrosis. Neoplastic process is felt to be less likely. CT Head W/O Contrast   Final Result      Multifocal areas of presumed edema with linear hemorrhagic staining throughout the cerebrum. Further evaluation with contrast-enhanced MRI as warranted. Findings discussed with Dr. Praful Varma in the emergency room on 9/18/2022 at [phone]. FL LUMBAR PUNCTURE DIAG    (Results Pending)         Assessment/Plan:   Deidra New is a 70-year-old male with no pertinent past medical history who presents to the ED with new onset seizures and imaging showing hyperintensity of the inferior bilateral temporal lobes concerning for infection versus inflammatory neurological process. New- Onset Seizures:  Ceftriaxone 2g  Acyclovir  Every 4 hour neurochecks  1g Keppra twice daily IV  Neurology consulted-appreciate recommendations  Will have LP with CSF studies- IR completed study, will follow-up on results. Neurosurgery consulted, appreciate recommendations.   Follow-up urinalysis, urine culture, urine drug screen  Follow-up lactate  Trend CK daily x3 days  Pain control with Tylenol as needed  UDS  Decadron 4mg x1    Code Status:Full Code  FEN: Diet NPO  PPX:  Lovenox  DISPO: VALERIE Koenig MD  PGY1, Internal Medicine  09/19/22  8:41 AM      This patient will be staffed and discussed with Katie Campos MD.

## 2022-09-19 NOTE — PLAN OF CARE
Problem: Pain  Goal: Verbalizes/displays adequate comfort level or baseline comfort level  Outcome: Progressing  Note: Pt. Managing pain per MAR. Problem: Safety - Adult  Goal: Free from fall injury  Outcome: Progressing  Note: Pt. Free from falls this shift. All fall precautions in place and call light is within reach.

## 2022-09-19 NOTE — CONSULTS
Neurology / Neurocritical Care Consult Note    Pao Landis MD is requesting this consult. Reason for Consult: new onset seizure  Admission Chief Complaint: new seizure    History of Present Illness     Stu Patterson is a 31 y.o. y/o male with no significant PMH. Per my interview with the patient and his significant other he had a possible seizure on 9/14. Patient's significant other has a video on where you hear a thump on the audio, and then witness the patient stumble into view and sit on the couch, shake his head and try to re-orient himself. Patient denies any memory of this event. On the morning of 9/18, patient's girlfriend he had another possible seizure with a semiology of shaking to his BUE, eyes rolling to the back of his head, and foaming at the mouth for ~5min. The patient does not remember this second event either. EMS was called and patient was taken to [facility] ED. Patient c/o a \"clogged L ear\", dizziness, headache, and photophobia on arrival to ED. CT head showed multifocal areas of possible edema with hemorrhagic staining and a subtle L temporal fracture. MRI head w wo contrast showed hyperintensity on T2 in the anterior R frontal lobe w/out mass effect suspicious for acute inflammatory or infectious process. WBC count 15.4 in ED. Patient afebrile on arrival.  Admitted to  for further workup. REVIEW OF SYSTEMS:   Constitutional- No weight loss or fevers   Eyes- No diplopia. C/o photophobia. Ears/nose/throat- No dysphagia. No Dysarthria. \"C/o plugged L ear\"   Cardiovascular- No palpitations. No chest pain   Respiratory- No dyspnea. No Cough   Gastrointestinal- No Abdominal pain. No Vomiting. Genitourinary- No incontinence. No urinary retention   Musculoskeletal- No myalgia. No arthralgia   Skin- No rash. No easy bruising. Psychiatric- No depression. No anxiety   Endocrine- No diabetes. No thyroid issues. Hematologic- No bleeding difficulty.  No fatigue   Neurologic- C/o dizziness when going from sitting to standing, photophobia, and L temporal headache rated at 5/10. Past Medical, Surgical, Family, and Social History   PAST MEDICAL HISTORY:  No past medical history on file. SURGICAL HISTORY:  No past surgical history on file. FAMILY HISTORY & SOCIAL HISTORY:  Family history non-contributory  No family history on file. Social History     Tobacco Use    Smoking status: Every Day     Packs/day: 0.25     Types: Cigars, Cigarettes    Smokeless tobacco: Never   Substance Use Topics    Drug use: Yes     Types: Marijuana Elva Ilia)     Comment: occ         Allergies & Outpatient Medications   ALLERGIES:  Allergies   Allergen Reactions    Erythromycin Anaphylaxis     Throat swells      HOME MEDICATIONS:  There are no discharge medications for this patient.         Physical Exam   PHYSICAL EXAM:  Vitals:    09/18/22 1630 09/18/22 2315 09/19/22 0215 09/19/22 0643   BP: 126/79 126/73 113/67 120/78   Pulse: 63 64 59 59   Resp: 14 16 16 16   Temp: 98.6 °F (37 °C) 98.5 °F (36.9 °C) 98.7 °F (37.1 °C) 97.9 °F (36.6 °C)   TempSrc: Oral Oral Oral Oral   SpO2: 99% 96% 97% 98%   Weight:       Height:             General: Alert, no distress, well-nourished  Neurologic  Mental status:   orientation to person, place, time, situation   Attention intact as able to attend well to the exam     Language fluent in conversation   Comprehension intact; follows simple commands    Cranial nerves:   CN2: Visual fields full w/o extinction on confrontational testing   CN 3,4,6: Pupils equal and reactive to light, extraocular muscles intact  CN5: Facial sensation symmetric   CN7: Face symmetric  CN8: Hearing symmetric to spoken voice  CN9: Palate elevated symmetrically  CN11: Traps full strength on shoulder shrug  CN12: Tongue midline with protrusion    Motor Exam:   R  L    Deltoid 5  5   Biceps 5 5   Triceps 5 5   Wrist extension  5 5   Interossei 5 5      R  L    Hip flexion  5  5   Hip extension  5 5   Knee flexion  5 5   Knee extension  5 5   Ankle dorsiflexion  5 5   Ankle plantar flexion  5 5       Sensory: light touch intact and symmetric in all 4 extremities. No sensory extinction on bilateral simultaneous stimulation  Cerebellar/coordination: finger nose finger normal without ataxia  Tone: normal in all 4 extremities  Gait: held 2/2 patient safety      OTHER SYSTEMS:  Cardiovascular: Warm, appears well perfused   Respiratory: Easy, non-labored respiratory pattern   Abdominal: Abdomen is without distention   Extremities: Upper and lower extremities are atraumatic in appearance without deformity. No swelling or erythema. Diagnostic Testing Results   IMAGES:  Images personally reviewed and agree w/ radiology interpretation. Head CT w/o Contrast:  Impression       Multifocal areas of presumed edema with linear hemorrhagic staining throughout the cerebrum. Further evaluation with contrast-enhanced MRI as warranted. ADDENDUM #1    Upon review of the CT images, there is small amount of intracranial gas    along the inner table of the left temporal bone. There is subtle fracture    associated with the squamous portion of the left temporal bone. MRI Brain w & w/o Contrast:  Impression   Impression:    T2/FLAIR hyperintensity in the right frontal lobe with associated pachymeningeal enhancement as well as subtle regions of T2 hyperintensity in the inferior bilateral temporal lobes. Differential diagnosis is broad and favored to represent an acute inflammatory or infectious process such as cerebritis, encephalitis. Ischemic process is felt to be less likely but there is some susceptibility artifact in the region of interest that could represent some amount of cortical laminar necrosis. Neoplastic process is felt to be less likely. LABS:  All results below personally reviewed. Pertinent positives & negatives are addressed in Impression & Recommendations below.      LABS   Metabolic Panel Recent Labs     09/18/22  1250 09/19/22  0606    140   K 3.8 4.3   CL 97* 99   CO2 25 29   BUN 11 10   CREATININE 0.8* 0.9   GLUCOSE 114* 95   CALCIUM 9.3 9.4   LABALBU 4.7  --    ALKPHOS 64  --    ALT 29  --    AST 13*  --       CBC / Coags Recent Labs     09/18/22  1250 09/19/22  0607   WBC 15.4* 12.6*   RBC 5.54 5.07   HGB 16.7 15.3   HCT 48.7 44.1    312   INR  --  1.05      Other No results for input(s): LABA1C, LDLCALC, TRIG, TSH, FIQAUOOU99, FOLATE, LABSALI, COVID19 in the last 72 hours. Recent Labs     09/18/22  1729   LACTA 2.2*          CURRENT SCHEDULED MEDICATIONS   Inpatient Medications     sodium chloride flush, 5-40 mL, IntraVENous, 2 times per day    enoxaparin, 40 mg, SubCUTAneous, Daily    levETIRAcetam, 1,000 mg, IntraVENous, Q12H    cefTRIAXone (ROCEPHIN) IV, 2,000 mg, IntraVENous, Q12H    acyclovir, 10 mg/kg (Ideal), IntraVENous, Q8H   Infusions    sodium chloride 25 mL/hr at 09/19/22 0220      Antibiotics   Recent Abx Admin                     cefTRIAXone (ROCEPHIN) 2,000 mg in dextrose 5 % 50 mL IVPB mini-bag (mg) 2,000 mg New Bag 09/19/22 0530     2,000 mg New Bag 09/18/22 1855    acyclovir (ZOVIRAX) 650 mg in dextrose 5 % 100 mL IVPB (mg) 650 mg New Bag 09/19/22 0220                       IMPRESSION & RECOMMENDATIONS     IMPRESSION:  Edilia Puckett is a 30 yo male who presents with seizures (\"BUE shaking, foaming at the mouth, and eyes rolling back in his head\") on 9/14 and 9/18 with a possible fall on 9/14. Patient c/o headache, dizziness when standing, photophobia, and L ear feeling \"clogged\". CT head and MRI head w and wo show area of possible inflammation or infection vs. Traumatic contusion. WBC on arrival to [facility] ED was 15.4, but patient has been afebrile.        RECOMMENDATIONS:  - continue acyclovir and ceftriaxone until meningitis panel results  - continue keppra 1g BID  - lumbar puncture completed, results pending  - q4h neuro checks  - routine EEG  - Activity / Diet as tolerates  - SBP <180  - Hold antiplts / full dose anticoagulants x 2 weeks  - PT/OT for dizziness / imbalance  - SLP for cognitive evaluation   - Headaches: Tylenol 650mg PO Q6H PRN  - Dizziness: Meclizine 25mg PO TID PRN  - Nausea: Zofran 4mg IV Q6H PRN. Consider scopolamine patch for persistent nausea  - Will require 24 hour supervision for first 48 hours after discharge  - Mild TBI education prior to discharge   - follow up with outpatient neurology on discharge  - driving restriction for 3 months        DELIA Wynn - CNP   Neurology & Neurocritical Care   Neurology Line: [phone]  [employer]: [facility] NPs  9/19/2022 9:07 AM    I spent 75 minutes in the care of this patient. Over 50% of that time was in face-to-face counseling regarding disease process, diagnostic testing, preventative measures, and answering patient and family questions.

## 2022-09-19 NOTE — PLAN OF CARE
Problem: Discharge Planning  Goal: Discharge to home or other facility with appropriate resources  Outcome: Progressing     Problem: Pain  Goal: Verbalizes/displays adequate comfort level or baseline comfort level  9/19/2022 0750 by aWrren Gilmore RN  Outcome: Progressing   Pt endorsing no pain at this time. Pain being managed with prn and scheduled pain medication per the [facility] with some relief. Pt using rest, repositioning and distraction as non-pharm measures to decrease pain and promote comfort. Problem: Safety - Adult  Goal: Free from fall injury  9/19/2022 0750 by Warren Gilmore RN  Outcome: Progressing   All fall precautions in place. Bed locked and in lowest position with alarm on. Overbed table and personal belonings within reach. Call light within reach and patient instructed to use call light for assistance. Non-skid socks on.

## 2022-09-19 NOTE — CONSULTS
NEUROSURGERY CONSULT NOTE    Venecia Sanches  [id number]   1995   9/19/2022    Requesting physician: Suad Cooper MD    Reason for consultation: new onset seizure    History of present illness: Patient is a 31 y.o. male w/ no significant PMH . Patient and his significant other stated he had a possible seizure on 9/14. Patient's significant other has a video on where you hear a thump on the audio, and then witness the patient stumble into view and sit on the couch, shaking his head. Patient has no memory of this event. On the morning of 9/18, patient's girlfriend stated he had another possible seizure with shaking to his BUE, eyes rolling to the back of his head, and foaming at the mouth for approximately 5 min. The patient has no memory of this second event either. EMS was called and patient was taken to [facility] ED. Patient c/o dizziness, headache, and photophobia on arrival to ED. CT head showed multifocal areas of possible edema with hemorrhagic staining and a subtle L temporal fracture. MRI head w wo contrast showed hyperintensity on T2 in the anterior R frontal lobe w/out mass effect suspicious for contusion. Probable subacute. This would fit if pt fell on 9/14. ROS:   GENERAL:  Denies fever or recent illness. Denies weight changes   EYES:  Denies vision change or diplopia + photophobia  EARS:  Denies hearing loss. Plugged L ear  CARDIAC:  Denies chest pain  RESPIRATORY:  Denies shortness of breath  SKIN:  Denies rash or lesions   HEM:  Denies excessive bruising  PSYCH:  Denies anxiety or depression  NEURO:  + headache and dizziness , numbness or tingling or lateralizing weakness   :  Denies urinary difficulty  GI: Denies nausea, vomiting, diarrhea or constipation  MUSCULOSKELETAL:  No arthralgias    Allergies   Allergen Reactions    Erythromycin Anaphylaxis     Throat swells        No past medical history on file. No past surgical history on file. Social History     Occupational History    Not on file   Tobacco Use    Smoking status: Every Day     Packs/day: 0.25     Types: Cigars, Cigarettes    Smokeless tobacco: Never   Substance and Sexual Activity    Alcohol use: Not on file     Comment: occ    Drug use: Yes     Types: Marijuana [place])     Comment: occ    Sexual activity: Yes     Partners: Female        No family history on file. No outpatient medications have been marked as taking for the 9/18/22 encounter [facility] Encounter).         Current Facility-Administered Medications   Medication Dose Route Frequency Provider Last Rate Last Admin    sodium chloride flush 0.9 % injection 5-40 mL  5-40 mL IntraVENous 2 times per day Coty Jamil MD   10 mL at 09/18/22 2109    sodium chloride flush 0.9 % injection 5-40 mL  5-40 mL IntraVENous PRN Coty Jamil MD        0.9 % sodium chloride infusion   IntraVENous PRN Coty Jamil MD 20 mL/hr at 09/19/22 1357 New Bag at 09/19/22 1357    enoxaparin (LOVENOX) injection 40 mg  40 mg SubCUTAneous Daily Coty Jamil MD        ondansetron (ZOFRAN-ODT) disintegrating tablet 4 mg  4 mg Oral Q8H PRN Coty Jamil MD        Or    ondansetron (ZOFRAN) injection 4 mg  4 mg IntraVENous Q6H PRN Coty Jamil MD        polyethylene glycol (GLYCOLAX) packet 17 g  17 g Oral Daily PRN Coty Jamil MD        acetaminophen (TYLENOL) tablet 650 mg  650 mg Oral Q6H PRN Coty Jamil MD        Or    acetaminophen (TYLENOL) suppository 650 mg  650 mg Rectal Q6H PRN Coty Jamil MD        levETIRAcetam (KEPPRA) 1,000 mg in sodium chloride 0.9 % 100 mL IVPB  1,000 mg IntraVENous Q12H Coty Jamil MD   Stopped at 09/19/22 1448    cefTRIAXone (ROCEPHIN) 2,000 mg in dextrose 5 % 50 mL IVPB mini-bag  2,000 mg IntraVENous Q12H Coty Jamil MD   Stopped at 09/19/22 0643    acyclovir (ZOVIRAX) 650 mg in dextrose 5 % 100 mL IVPB  10 mg/kg (Ideal) IntraVENous Q8H Josy Jackson Tomeka Escobar MD   Stopped at 09/19/22 1222        Objective:  /75   Pulse 65   Temp 99.5 °F (37.5 °C) (Oral)   Resp 16   Ht 5' 7.01\" (1.702 m)   Wt 161 lb (73 kg)   SpO2 97%   BMI 25.21 kg/m²     Physical Exam:   Patient seen and examined  GCS:  4 - Opens eyes on own  5 - Alert and oriented  6 - Follows simple motor commands  General: Well developed. Alert and cooperative in no acute distress. HENT: atraumatic, neck supple  Eyes: Optic discs: Not tested  Pulmonary: unlabored respiratory effort  Cardiovascular:  Warm well perfused. No peripheral edema  Gastrointestinal: abdomen soft, NT, ND    Neurological:  Mental Status: Awake, alert, oriented x 4, speech clear and appropriate  Attention: Intact  Language: No aphasia or dysarthria noted  Sensation: Intact to all extremities to light touch  Coordination: Intact  DTRs:     Cranial Nerves:  II: Visual acuity not tested, denies new visual changes / diplopia  III, IV, VI: PERRL, 3 mm bilaterally, EOMI, no nystagmus noted  V: Facial sensation intact bilaterally to touch  VII: Face symmetric  VIII: Hearing intact bilaterally to spoken voice  IX: Palate movement equal bilaterally  XI: Shoulder shrug equal bilaterally  XII: Tongue midline    Musculoskeletal:   Gait: Not tested   Assist devices: None   Tone: normal  Motor strength:    Right  Left    Right  Left    Deltoid  5 5  Hip Flex  5 5   Biceps  5 5  Knee Extensors  5 5   Triceps  5 5  Knee Flexors  5 5   Wrist Ext  5 5  Ankle Dorsiflex. 5 5   Wrist Flex  5 5  Ankle Plantarflex. 5 5   Handgrip  5 5       Thumb Ext  5 5         Radiological Findings:   CT head      Multifocal areas of presumed edema with linear hemorrhagic staining throughout the cerebrum. Further evaluation with contrast-enhanced MRI as warranted. MRI brain  T2/FLAIR hyperintensity in the right frontal lobe with associated pachymeningeal enhancement as well as subtle regions of T2 hyperintensity in the inferior bilateral temporal lobes. Differential diagnosis is broad and favored to represent an acute inflammatory or infectious process such as cerebritis, encephalitis. Ischemic process is felt to be less likely but there is some susceptibility artifact in the region of interest that could represent some amount of cortical laminar necrosis. Neoplastic process is felt to be less likely. Labs:  Recent Labs     09/19/22  0607   WBC 12.6*   HGB 15.3   HCT 44.1          Recent Labs     09/19/22  0606      K 4.3   CL 99   CO2 29   BUN 10   CREATININE 0.9   GLUCOSE 95   CALCIUM 9.4       Recent Labs     09/19/22  0607   PROTIME 13.7   INR 1.05       Patient Active Problem List    Diagnosis Date Noted    Seizure ([facility] [address] 09/18/2022       Assessment:  Patient is a 31 y.o. male with new onset seizure. Most likely from fall. CT and MRI show a fracture of L temporal bone which would be consistent with trauma like a fall. His CT shows contusions that are consisten with trauma most likely sub acute which would fit from fall on 9/14. Plan:  No neurosurgical intervention indicated   Neuro checks Q4H  Neuro Critical care consulted for seizure  DVT Prophylaxis: SCD's  Pain: Managed by medical team  PT/OT consulted, appreciate recs  Advance diet / activity per primary team  Will follow. Please call with any questions or decline in neurological status    DISPO: Dispo timing to be determined by primary team once patient is medically stable for discharge. Patient was seen and examined with Dr. Iliana Sanchez who agrees with above assessment and plan. Electronically signed by:  DELIA Martins CNP, APRN-CNP, 9/19/2022 4:03 PM  [phone]

## 2022-09-19 NOTE — PROGRESS NOTES
Comprehensive Nutrition Assessment    Type and Reason for Visit:  Initial, Positive Nutrition Screen    Nutrition Recommendations/Plan:   Continue Regular diet  Monitor nutrition adequacy, pertinent labs, bowel habits, wt changes, and clinical progress     Malnutrition Assessment:  Malnutrition Status: At risk for malnutrition (Comment) (09/19/22 7098)    Context:  Acute Illness     Findings of the 6 clinical characteristics of malnutrition:  Energy Intake:  Mild decrease in energy intake (Comment) (Prior to admission)  Weight Loss:  No significant weight loss (Pt reports UBW of 170#, unsure of timeline)     Body Fat Loss:  No significant body fat loss     Muscle Mass Loss:  No significant muscle mass loss    Fluid Accumulation:  No significant fluid accumulation      Nutrition Assessment:    Positive nutrition screen: Pt admitted after seizure. On a Regular diet, has has consumed ~50% of his dinner last night and lunch today. Pt reports that 5 days prior to admission he had no appetite and did not eat for 4 days. The day prior to being admitted he drank soup and had no issues w/ N/V or abdominal pain. His appetite is starting to come back now and he reports no issues w/ N/V. He also stated that he is beginning to feel hungry again. Pt reports UBW of #170. Will continue to monitor. Nutrition Related Findings:    Labs reviewed. Wound Type: None       Current Nutrition Intake & Therapies:    Average Meal Intake: 26-50%, 51-75%  Average Supplements Intake: None Ordered  ADULT DIET; Regular    Anthropometric Measures:  Height: 5' 7.01\" (170.2 cm)  Ideal Body Weight (IBW): 148 lbs (67 kg)       Current Body Weight: 160 lb 15 oz (73 kg), 108.7 % IBW. Weight Source: Bed Scale  Current BMI (kg/m2): 25.2        Weight Adjustment For: No Adjustment                 BMI Categories: Overweight (BMI 25.0-29. 9)      Nutrition Diagnosis:   Inadequate oral intake related to inadequate protein-energy intake as evidenced by poor intake prior to admission    Nutrition Interventions:   Food and/or Nutrient Delivery: Continue Current Diet  Nutrition Education/Counseling: Education not indicated  Coordination of Nutrition Care: Continue to monitor while inpatient  Plan of Care discussed with: Pt    Goals:  Previous Goal Met: Progressing toward Goal(s)  Goals: PO intake 75% or greater, prior to discharge       Nutrition Monitoring and Evaluation:   Behavioral-Environmental Outcomes: None Identified  Food/Nutrient Intake Outcomes: Food and Nutrient Intake  Physical Signs/Symptoms Outcomes: Biochemical Data, Nutrition Focused Physical Findings, Weight, Nausea or Vomiting    Discharge Planning:    Continue current diet     Paris Winter RD, LD  Contact: [phone]

## 2022-09-19 NOTE — CARE COORDINATION
[address] Case Management Assessment [facility] - . [id number]    Case Management Assessment  Initial Evaluation    Date/Time of Evaluation: 9/19/2022 12:38 PM  Assessment Completed by: Darrick Conley RN    If patient is discharged prior to next notation, then this note serves as note for discharge by case management. Patient Name: Adolfo Mancilla                   YOB: 1995  Diagnosis: Seizure [facility]) [R56.9]                   Date / Time: 9/18/2022 12:06 PM    Patient Admission Status: Inpatient     Current PCP: Ivy Pichardo, DO  PCP verified by CM? No    Chart Reviewed: Yes      Patient Interviewed: Yes   Family Interviewed:  No   Patient Orientation: Alert and Oriented    Patient Cognition: Alert  History Provided by: Patient    Hospitalization in the last 30 days (Readmission):  No    If yes, Readmission Assessment in  Navigator will be completed. Advance Directives:     Code Status: Full Code       Discharge Planning  Patient lives with: Spouse/Significant Other, Children Type of Home: House  Primary Care Giver: Self  Patient Support Systems include: Spouse/Significant Other, Children   Current Financial resources:    Current community resources:    Current services prior to admission: None   Type of Home Care services:  None    ADLS  Prior functional level: Independent in ADLs/IADLs  Current functional level: Independent in ADLs/IADLs    PT AM-PAC:   /24  OT AM-PAC:   /24    Family can provide assistance at DC: Yes  Would you like Case Management to discuss the discharge plan with any other family members/significant others, and if so, who?  No  Plans to Return to Present Housing: Yes  Other Identified Issues/Barriers to RETURNING to current housing:   Potential Assistance needed at discharge: N/A  Patient expects to discharge to: [address] [place] [address] for transportation at discharge: Family    Financial  Payor: /     Does insurance require precert for SNF: Yes    Potential assistance Purchasing Medications: No  Meds-to-Beds request: Yes    No Pharmacies Listed    Factors facilitating achievement of predicted outcomes: Family support    Barriers to discharge: Medical complications    Additional Case Management Notes: pt from home with girl friend and 9 month old son. No services used at home. Girl friend to drive home at dc. No needs anticipated from  at this time. The Plan for Transition of Care is related to the following treatment goals of Seizure ([facility] [address] [S47.6]    IF APPLICABLE: The Patient and/or patient representative [facility] and his family were provided with a choice of provider and agrees with the discharge plan. Freedom of choice list with basic dialogue that supports the patient's individualized plan of care/goals and shares the quality data associated with the providers was provided to: Patient   Patient Representative Name:       The Patient and/or Patient Representative Agree with the Discharge Plan?  Yes    Che Mcknight RN  Case Management Department  Ph: [phone] Fax: [phone]

## 2022-09-19 NOTE — DISCHARGE INSTRUCTIONS
Seizure Driving Risk: Having a Seizure while driving puts you at risk for injuring yourself or others. If you drive while having uncontrolled seizures, you may be held liable for injuries to others. Do not drive until you have been seizure free for at least 3 months, state laws vary so please check laws in your state. Injury Risk: Please avoid working from [employer], swimming alone or taking baths in a bathtub, use showers only. Mild Traumatic Brain Injury (TBI)/Concussion    What is Traumatic Brain Injury? Traumatic Brain Injury (TBI) is an injury to the brain caused by a blow or jolt to the head from blunt or penetrating trauma. The injury that occurs at the moment of impact is known as the primary injury. Primary injuries can involve a specific part of the brain or the entire brain. Immediately after the accident the person may be confused, not remember what happened, have blurry vision, dizziness, or lose consciousness. What are the symptoms? Depending on the type and location of the injury, the person's symptoms may include: Loss of consciousness   Confusion and disorientation  Memory loss / amnesia   Fatigue   Headaches   Visual problems   Poor attention / concentration   Sleep disturbances   Dizziness / loss of balance   Irritability / emotional disturbances   Feelings of depression   Seizures  Vomiting    Treatment:  Patients with a Mild TBI usually do not require surgery. They generally need rest and sometimes may need medications to help relieve headaches. Recovery: After a mild brain injury, patients can have a wide variation of symptoms. Some patients may not experience any symptoms while others can have severe symptoms with headaches, dizziness, memory problems, sleep disorders, fatigue, changing emotions, and/or seizures. Next is a description of the common problems people experience after a mild brain injury:  Headaches:   Headaches are a very common problem after a Mild TBI.  Most patients with a mild TBI will experience headaches. Over the counter acetaminophen is the best medication to treat your headaches. In most patients, headaches will go away within 1-2 weeks and they should gradually improve with time. If your headache were to worsen or become severe and unrelieved by pain medication, then call your healthcare provider. Dizziness:  Dizziness is another very common symptom. The dizziness should improve with time; however, if it becomes severe and makes it difficult to complete tasks, please contact your health care provider to discuss options for treating these symptoms. Memory problems:  Memory problems are another common problem among patients with a Mild TBI. This can range from problems with organizing tasks to problems remembering names or the grocery list. This is called your short-term memory. Generally, these memory problems are mild and will resolve over time, however some patients may need to get help from a speech therapist for more severe or persistent symptoms. A speech therapist can help with ways to organize and provide tips to practice on improving your short-term memory. Sleep disorders / Fatigue:  Patients with a mild TBI can also have problems with sleep and fatigue. Initially after a head injury patient will feel tired, need frequent rest periods, and may want to sleep most of the time for the first several days. It is important to increase your activity level gradually every day. Increasing your activity to include light exercise will help your symptoms to improve more quickly. When going back to work it is important to take this into account. You might need to work part-time for the first week to build up your endurance before going back full-time. Like all other symptoms with a mild TBI this will diminish with time. Emotions:  After a mild TBI some patients have trouble controlling their emotions.  This means they might get mad or angry in a situation where they normally wouldn't. Sometimes patients say they cry easily, even at a sad commercial on TV. This can be a huge adjustment and can cause strain on relationships. Caregivers or spouses may be more likely to notice these subtle changes in the patient's personality. If these symptoms are persistent contacting the patient's physician to get a neuropsychological evaluation may identify specific cognitive areas that were weakened or changed by the TBI and help provide direction for further care. Seizures:   Seizures can occur anytime the brain is injured. Patients with a mild TBI are often not given seizure medications because they have a low risk of having a seizure, however at the discretion of the medical team some patients may be put on medication to prevent seizures from occurring for a short period of time after the injury. Prevention  Tips to reduce the risk for a head injury:  Always wear your helmet when riding a bicycle, motorcycle, skateboard, or all-terrain vehicle. Never drive under the influence of alcohol or drugs. Always wear your seat belt and ensure that children are secured in the appropriate child safety seats. Avoid falls in the home by keeping unsecured items off the floor, installing safety features such as non-slip mats in the bathtub, handrails on stairways, and keeping items off of stairs. Avoid falls by participating in an exercise program to increase strength, balance, and coordination. Store firearms in a locked cabinet with bullets in a separate location. Wear protective headgear while playing sports. When should I return to the emergency room? After a patient is discharged home, they will need constant supervision for the next 24-48 hours from a loved one to watch for changes. They need to return to the emergency room if the following issues occur:   If the patient is unable to be awoken from sleep  If the patient has new onset nausea and vomiting or nausea and vomiting that is unable to be controlled with medication  If the patient becomes confused or disoriented  If the patient develops weakness on one side of their body  If the patient develops difficultly talking or understanding what is being said  If the patient has a seizure    Resources: The Brain Injury Association:  www.biaofela. [facility]. org

## 2022-09-19 NOTE — PROGRESS NOTES
Pt. Oriented x4. VSS on RA. No acute neuro changes overnight. Pt. States he has some weakness and dizziness while ambulating, denies nausea and pain. Pt. Voiding well, SBA to bathroom. All fall and seizure precautions in place and call light is within reach.

## 2022-09-20 VITALS
WEIGHT: 161 LBS | RESPIRATION RATE: 16 BRPM | HEIGHT: 67 IN | BODY MASS INDEX: 25.27 KG/M2 | HEART RATE: 68 BPM | OXYGEN SATURATION: 98 % | SYSTOLIC BLOOD PRESSURE: 110 MMHG | TEMPERATURE: 98.5 F | DIASTOLIC BLOOD PRESSURE: 65 MMHG

## 2022-09-20 LAB
ANION GAP SERPL CALCULATED.3IONS-SCNC: 13 MMOL/L (ref 3–16)
BASOPHILS ABSOLUTE: 0.1 K/UL (ref 0–0.2)
BASOPHILS RELATIVE PERCENT: 0.7 %
BUN BLDV-MCNC: 10 MG/DL (ref 7–20)
CALCIUM SERPL-MCNC: 9.1 MG/DL (ref 8.3–10.6)
CHLORIDE BLD-SCNC: 98 MMOL/L (ref 99–110)
CO2: 24 MMOL/L (ref 21–32)
CREAT SERPL-MCNC: 0.7 MG/DL (ref 0.9–1.3)
EOSINOPHILS ABSOLUTE: 0.1 K/UL (ref 0–0.6)
EOSINOPHILS RELATIVE PERCENT: 0.9 %
GFR AFRICAN AMERICAN: >60
GFR NON-AFRICAN AMERICAN: >60
GLUCOSE BLD-MCNC: 111 MG/DL (ref 70–99)
HCT VFR BLD CALC: 44.1 % (ref 40.5–52.5)
HEMOGLOBIN: 15.5 G/DL (ref 13.5–17.5)
HIV AG/AB: NORMAL
HIV ANTIGEN: NORMAL
HIV-1 ANTIBODY: NORMAL
HIV-2 AB: NORMAL
LYMPHOCYTES ABSOLUTE: 1.7 K/UL (ref 1–5.1)
LYMPHOCYTES RELATIVE PERCENT: 15 %
MCH RBC QN AUTO: 30.6 PG (ref 26–34)
MCHC RBC AUTO-ENTMCNC: 35.2 G/DL (ref 31–36)
MCV RBC AUTO: 86.7 FL (ref 80–100)
MONOCYTES ABSOLUTE: 1.4 K/UL (ref 0–1.3)
MONOCYTES RELATIVE PERCENT: 12.4 %
NEUTROPHILS ABSOLUTE: 8.2 K/UL (ref 1.7–7.7)
NEUTROPHILS RELATIVE PERCENT: 71 %
PDW BLD-RTO: 12.9 % (ref 12.4–15.4)
PLATELET # BLD: 339 K/UL (ref 135–450)
PMV BLD AUTO: 7 FL (ref 5–10.5)
POTASSIUM REFLEX MAGNESIUM: 4 MMOL/L (ref 3.5–5.1)
RBC # BLD: 5.09 M/UL (ref 4.2–5.9)
SODIUM BLD-SCNC: 135 MMOL/L (ref 136–145)
TOTAL CK: 96 U/L (ref 39–308)
TOTAL SYPHILLIS IGG/IGM: NORMAL
WBC # BLD: 11.5 K/UL (ref 4–11)

## 2022-09-20 PROCEDURE — 97116 GAIT TRAINING THERAPY: CPT

## 2022-09-20 PROCEDURE — 97165 OT EVAL LOW COMPLEX 30 MIN: CPT

## 2022-09-20 PROCEDURE — 97161 PT EVAL LOW COMPLEX 20 MIN: CPT

## 2022-09-20 PROCEDURE — 97535 SELF CARE MNGMENT TRAINING: CPT

## 2022-09-20 PROCEDURE — 80048 BASIC METABOLIC PNL TOTAL CA: CPT

## 2022-09-20 PROCEDURE — 6360000002 HC RX W HCPCS

## 2022-09-20 PROCEDURE — 97530 THERAPEUTIC ACTIVITIES: CPT

## 2022-09-20 PROCEDURE — 92523 SPEECH SOUND LANG COMPREHEN: CPT

## 2022-09-20 PROCEDURE — 97167 OT EVAL HIGH COMPLEX 60 MIN: CPT

## 2022-09-20 PROCEDURE — 85025 COMPLETE CBC W/AUTO DIFF WBC: CPT

## 2022-09-20 PROCEDURE — 2580000003 HC RX 258

## 2022-09-20 PROCEDURE — 82550 ASSAY OF CK (CPK): CPT

## 2022-09-20 PROCEDURE — 36415 COLL VENOUS BLD VENIPUNCTURE: CPT

## 2022-09-20 RX ORDER — LEVETIRACETAM 500 MG/1
1000 TABLET ORAL 2 TIMES DAILY
Qty: 360 TABLET | Refills: 0 | Status: SHIPPED | OUTPATIENT
Start: 2022-09-20 | End: 2022-12-19

## 2022-09-20 RX ADMIN — SODIUM CHLORIDE 1000 MG: 9 INJECTION, SOLUTION INTRAVENOUS at 03:01

## 2022-09-20 RX ADMIN — CEFTRIAXONE 2000 MG: 2 INJECTION, POWDER, FOR SOLUTION INTRAMUSCULAR; INTRAVENOUS at 06:06

## 2022-09-20 RX ADMIN — SODIUM CHLORIDE, PRESERVATIVE FREE 10 ML: 5 INJECTION INTRAVENOUS at 08:26

## 2022-09-20 RX ADMIN — ACYCLOVIR SODIUM 650 MG: 50 INJECTION, SOLUTION INTRAVENOUS at 01:20

## 2022-09-20 RX ADMIN — ENOXAPARIN SODIUM 40 MG: 100 INJECTION SUBCUTANEOUS at 08:26

## 2022-09-20 NOTE — CONSULTS
NEUROSURGERY CONSULT NOTE    Genia Winkler  [id number]   1995   9/19/2022    Requesting physician: Emily Amaro MD    Reason for consultation: new onset seizure    History of present illness: Patient is a 31 y.o. male w/ no significant PMH . Patient and his significant other stated he had a possible seizure on 9/14. Patient's significant other has a video on where you hear a thump on the audio, and then witness the patient stumble into view and sit on the couch, shaking his head. Patient has no memory of this event. On the morning of 9/18, patient's girlfriend stated he had another possible seizure with shaking to his BUE, eyes rolling to the back of his head, and foaming at the mouth for approximately 5 min. The patient has no memory of this second event either. EMS was called and patient was taken to [facility] ED. Patient c/o dizziness, headache, and photophobia on arrival to ED. CT head showed multifocal areas of possible edema with hemorrhagic staining and a subtle L temporal fracture. MRI head w wo contrast showed hyperintensity on T2 in the anterior R frontal lobe w/out mass effect suspicious for contusion. Probable subacute. This would fit if pt fell on 9/14. ROS:   GENERAL:  Denies fever or recent illness. Denies weight changes   EYES:  Denies vision change or diplopia + photophobia  EARS:  Denies hearing loss. Plugged L ear  CARDIAC:  Denies chest pain  RESPIRATORY:  Denies shortness of breath  SKIN:  Denies rash or lesions   HEM:  Denies excessive bruising  PSYCH:  Denies anxiety or depression  NEURO:  + headache and dizziness , numbness or tingling or lateralizing weakness   :  Denies urinary difficulty  GI: Denies nausea, vomiting, diarrhea or constipation  MUSCULOSKELETAL:  No arthralgias    Allergies   Allergen Reactions    Erythromycin Anaphylaxis     Throat swells        No past medical history on file. No past surgical history on file. Social History     Occupational History    Not on file   Tobacco Use    Smoking status: Every Day     Packs/day: 0.25     Types: Cigars, Cigarettes    Smokeless tobacco: Never   Substance and Sexual Activity    Alcohol use: Not on file     Comment: occ    Drug use: Yes     Types: Marijuana Analy Bell)     Comment: occ    Sexual activity: Yes     Partners: Female        No family history on file. No outpatient medications have been marked as taking for the 9/18/22 encounter [facility] Encounter).         Current Facility-Administered Medications   Medication Dose Route Frequency Provider Last Rate Last Admin    sodium chloride flush 0.9 % injection 5-40 mL  5-40 mL IntraVENous 2 times per day Raul Martinez MD   10 mL at 09/18/22 2109    sodium chloride flush 0.9 % injection 5-40 mL  5-40 mL IntraVENous PRN Raul Martinez MD        0.9 % sodium chloride infusion   IntraVENous PRN Raul Martinez MD 20 mL/hr at 09/19/22 1357 New Bag at 09/19/22 1357    enoxaparin (LOVENOX) injection 40 mg  40 mg SubCUTAneous Daily Raul Martinez MD        ondansetron (ZOFRAN-ODT) disintegrating tablet 4 mg  4 mg Oral Q8H PRN Raul Martinez MD        Or    ondansetron (ZOFRAN) injection 4 mg  4 mg IntraVENous Q6H PRN Raul Martinez MD        polyethylene glycol (GLYCOLAX) packet 17 g  17 g Oral Daily PRN Raul Martinez MD        acetaminophen (TYLENOL) tablet 650 mg  650 mg Oral Q6H PRN Raul Martinez MD   650 mg at 09/19/22 1842    Or    acetaminophen (TYLENOL) suppository 650 mg  650 mg Rectal Q6H PRN Raul Martinez MD        levETIRAcetam (KEPPRA) 1,000 mg in sodium chloride 0.9 % 100 mL IVPB  1,000 mg IntraVENous Q12H Raul Martinez MD   Stopped at 09/19/22 1448    cefTRIAXone (ROCEPHIN) 2,000 mg in dextrose 5 % 50 mL IVPB mini-bag  2,000 mg IntraVENous Q12H Raul Martinez MD   Stopped at 09/19/22 1805    acyclovir (ZOVIRAX) 650 mg in dextrose 5 % 100 mL IVPB  10 mg/kg (Ideal) IntraVENous Q8H Thais Lam MD   Stopped at 09/19/22 2030        Objective:  /80   Pulse 68   Temp 98.4 °F (36.9 °C) (Oral)   Resp 16   Ht 5' 7.01\" (1.702 m)   Wt 161 lb (73 kg)   SpO2 98%   BMI 25.21 kg/m²     Physical Exam:   Patient seen and examined  GCS:  4 - Opens eyes on own  5 - Alert and oriented  6 - Follows simple motor commands  General: Well developed. Alert and cooperative in no acute distress. HENT: atraumatic, neck supple  Eyes: Optic discs: Not tested  Pulmonary: unlabored respiratory effort  Cardiovascular:  Warm well perfused. No peripheral edema  Gastrointestinal: abdomen soft, NT, ND    Neurological:  Mental Status: Awake, alert, oriented x 4, speech clear and appropriate  Attention: Intact  Language: No aphasia or dysarthria noted  Sensation: Intact to all extremities to light touch  Coordination: Intact  DTRs:     Cranial Nerves:  II: Visual acuity not tested, denies new visual changes / diplopia  III, IV, VI: PERRL, 3 mm bilaterally, EOMI, no nystagmus noted  V: Facial sensation intact bilaterally to touch  VII: Face symmetric  VIII: Hearing intact bilaterally to spoken voice  IX: Palate movement equal bilaterally  XI: Shoulder shrug equal bilaterally  XII: Tongue midline    Musculoskeletal:   Gait: Not tested   Assist devices: None   Tone: normal  Motor strength:    Right  Left    Right  Left    Deltoid  5 5  Hip Flex  5 5   Biceps  5 5  Knee Extensors  5 5   Triceps  5 5  Knee Flexors  5 5   Wrist Ext  5 5  Ankle Dorsiflex. 5 5   Wrist Flex  5 5  Ankle Plantarflex. 5 5   Handgrip  5 5       Thumb Ext  5 5         Radiological Findings:     I personally reviewed the patient's imaging which consists of a CT head and MRI dated 9/18/2022. These demonstrate bifrontal FLAIR as well as bilateral temporal FLAIR with hemorrhagic signal concerning for subacute bifrontal and bilateral contusions.        Labs:  Recent Labs     09/19/22  0607   WBC 12.6*   HGB 15.3   HCT 44.1   PLT 312         Recent Labs     09/19/22  0606      K 4.3   CL 99   CO2 29   BUN 10   CREATININE 0.9   GLUCOSE 95   CALCIUM 9.4         Recent Labs     09/19/22  0607   PROTIME 13.7   INR 1.05         Patient Active Problem List    Diagnosis Date Noted    Seizure ([facility] [address] 09/18/2022       Assessment:  Patient is a 31 y.o. male with new onset seizure. Most likely from fall. CT and MRI show a fracture of L temporal bone which would be consistent with trauma like a fall. His CT shows contusions that are consisten with trauma most likely sub acute which would fit from fall on 9/14. Plan:  No neurosurgical intervention indicated   Neuro checks Q4H  Neuro Critical care consulted for seizure  DVT Prophylaxis: SCD's  Pain: Managed by medical team  PT/OT consulted, appreciate recs  Will follow peripherally while in house.  Please call with any questions or decline in neurological status    Bunny Mcmahon MD, PhD  [address], [address] [place], [place], [address] [phone] (c), [phone] (o)

## 2022-09-20 NOTE — PROCEDURES
Name: Stu Patterson   : 1995   Interpreting Physician: Bala Storey MD   Referring Physician: No ref. provider found   Date of EE2022      Clinical History:Involuntary movement    Current Antiepileptic Medications: Current Facility-Administered Medications: sodium chloride flush 0.9 % injection 5-40 mL, 5-40 mL, IntraVENous, 2 times per day  sodium chloride flush 0.9 % injection 5-40 mL, 5-40 mL, IntraVENous, PRN  0.9 % sodium chloride infusion, , IntraVENous, PRN  enoxaparin (LOVENOX) injection 40 mg, 40 mg, SubCUTAneous, Daily  ondansetron (ZOFRAN-ODT) disintegrating tablet 4 mg, 4 mg, Oral, Q8H PRN **OR** ondansetron (ZOFRAN) injection 4 mg, 4 mg, IntraVENous, Q6H PRN  polyethylene glycol (GLYCOLAX) packet 17 g, 17 g, Oral, Daily PRN  acetaminophen (TYLENOL) tablet 650 mg, 650 mg, Oral, Q6H PRN **OR** acetaminophen (TYLENOL) suppository 650 mg, 650 mg, Rectal, Q6H PRN  levETIRAcetam (KEPPRA) 1,000 mg in sodium chloride 0.9 % 100 mL IVPB, 1,000 mg, IntraVENous, Q12H         Technical Summary:  The EEG was recorded in a digital format on a patient who is reported to be awake and drowsy state during the recording. The patient was not sleep deprived prior to the EEG. The recording revealed a normal background rhythm in the alpha frequency range that was maximal over the posterior head regions and reactive to eye opening and closure. The record was remarkable for the absence of epileptiform discharges. Photic stimulation was performed at various flash frequencies and without photoparoxysmal response    Hyperventilation was not performed due to medical condition. During the recording stage II sleep  was not seen. The EKG lead revealed no rhythm abnormalties. EEG Interpretation:   The EEG was normal in the awake and drowsy state. NO epileptiform activity. No focal, lateralizing, or epileptiform features were seen during the recording. Clinical correlation is recommended. The absence of epileptiform discharges on a single EEG does not rule out a diagnosis of  epilepsy or rule out non-convulsive or complex partial epilepticus.     Electronically signed by Swati Parisi MD on 9/20/2022 at 9:31 AM

## 2022-09-20 NOTE — CARE COORDINATION
Case Management Assessment            Discharge Note                    Date / Time of Note: 9/20/2022 9:39 AM                  Discharge Note Completed by: Darrick Conley RN    Patient Name: Adolfo Mancilla   YOB: 1995  Diagnosis: Seizure [facility]) [R56.9]   Date / Time: 9/18/2022 12:06 PM    Current PCP: Ivy Pichardo DO  Clinic patient: No    Hospitalization in the last 30 days: No    Advance Directives:  Code Status: Full Code  [address] DNR form completed and on chart: Not Indicated    Financial:  Payor: /      Pharmacy:  No Pharmacies [id number] medications?: Potential Assistance Purchasing Medications: No  Assistance provided by Case Management: Voucher with  Director Approval    Does patient want to participate in local refill/ meds to beds program?: Yes    Meds To [facility] Rules:  1. Can ONLY be done Monday- Friday between 8:30am-5pm  2. Prescription(s) must be in pharmacy by 3pm to be filled same day  3. Copy of patient's insurance/ prescription drug card and patient face sheet must be sent along with the prescription(s)  4. Cost of Rx cannot be added to hospital bill. If financial assistance is needed, please contact unit  or ;  or  CANNOT provide pharmacy voucher for patients co-pays  5.  Patients can then  the prescription on their way out of the hospital at discharge, or pharmacy can deliver to the bedside if staff is available. (payment due at time of pick-up or delivery - cash, check, or card accepted)     Able to afford home medications/ co-pay costs: Yes    ADLS:  Current PT AM-PAC Score: 24 /24  Current OT AM-PAC Score:   /24      DISCHARGE Disposition: Home- No Services Needed    LOC at discharge: Not Applicable  ERIKA Completed: Not Indicated    Notification completed in HENS/PAS?:  Not Applicable    IMM Completed:   Not Indicated    Transportation:  Transportation PLAN for discharge: self   Mode of Transport: Private Car    Referrals made at [facility] for outpatient continued care:  Not Applicable    Additional CM Notes: Pt from home with girlfriend who will transport to home at time of discharge. Pt provided with med voucher for Keppra medication, meds to beds sent. Voucher approved and signed. RN aware. The Plan for Transition of Care is related to the following treatment goals of Seizure [facility]) [R56.9]    The Patient and/or patient representative Estelita and his family were provided with a choice of provider and agrees with the discharge plan Yes    Freedom of choice list was provided with basic dialogue that supports the patient's individualized plan of care/goals and shares the quality data associated with the providers.  Yes    Care Transitions patient: No    Tod Martin  Case Management Department  Ph: [phone]  Fax: [phone]

## 2022-09-20 NOTE — PROGRESS NOTES
Speech Language Pathology  Facility/Department: [facility]  Initial Speech/Language/Cognitive Assessment  & Discharge    NAME: Mickie Lizama  : 1995   MRN: [id number]  ADMISSION DATE: 2022  ADMITTING DIAGNOSIS: has Seizure ([address] on their problem list.  DATE ONSET: 2022    Date of Eval: 2022   Evaluating Therapist: MIGUELANGEL Bhandari    Recent results MRI Brain: 2022  Impression:        T2/FLAIR hyperintensity in the right frontal lobe with associated pachymeningeal enhancement as well as subtle regions of T2 hyperintensity in the inferior bilateral temporal lobes. Differential diagnosis is broad and favored to represent an acute inflammatory or infectious process such as cerebritis, encephalitis. Ischemic process is felt to be less likely but there is some susceptibility artifact in the region of interest that could represent some amount of cortical laminar necrosis. Neoplastic process is felt to be less likely. ADDENDUM #1     Upon review of the CT images, there is small amount of intracranial gas    along the inner table of the left temporal bone. There is subtle fracture    associated with the squamous portion of the left temporal bone. Primary Complaint: referred to assess cognitive (subacute mild TBI)    Pain:  Pain Assessment  Pain Assessment: None - Denies Pain  Pain Level: 0  Patient's Stated Pain Goal: 7    Vision/ Hearing  Vision  Vision: Within Functional Limits  Hearing  Hearing: Within functional limits    Assessment:  Cognitive Diagnosis: WFL   Diagnosis: Speech, language, and cognitive-linguistic skills appear to be WNL. No aphasia, dysarthria, or apraxia appreciated. Pt completed the MOCA, scored 28/30 (score equal to or above 26 is considered WNL). Pt denies any noted difficulty within any cognitive domain. No further speech therapy needs identifed at this time. Will sign off.     Recommendations:  Recommendations  Requires SLP Intervention: No  Patient Education: Educated pt to purpose of visit, purpose of assessment, results/recommendations. Patient Education Response: Verbalizes understanding  Duration of Treatment: NA    Plan:   Speech Therapy Prognosis  Prognosis: Excellent  Prognosis Considerations: Age  Individuals consulted  Consulted and agree with results and recommendations: Patient;RN  RN Name: Kurt Carmona  Safety Devices in place: Yes  Type of devices: All fall risk precautions in place;Nurse notified    Goals:  Long-term Goals  Timeframe for Long-term Goals: NA  Short-term Goals  Timeframe for Short-term Goals: NA   Patient/family involved in developing goals and treatment plan: yes    Subjective:   Previous level of function and limitations:  Independent     Social/Functional History  Lives With: Significant other;Son (11 mos son)  Type of Home: House  Active : Yes  Mode of Transportation: Car  Additional Comments: girlfriend works from, able to be home at d/c  Vision  Vision: Within Functional Limits  Hearing  Hearing: Within functional limits     Objective:  Oral Motor   Labial: No impairment  Oral Hygiene: Moist;Clean    Motor Speech  Apraxic Characteristics: None  Dysarthric Characteristics: None  Intelligibility: No impairment  Overall Impairment Severity: None    Auditory Comprehension  Comprehension: Within Functional Limits    Expression  Primary Mode of Expression: Verbal    Verbal Expression  Verbal Expression: Within functional limits    Written Expression  Dominant Hand: Right    Pragmatics/Social Functioning  Pragmatics: Within functional limits    Cognition:   Orientation  Overall Orientation Status: Within Normal Limits  Attention  Attention: Within Functional Limits  Memory  Memory: Within Functional Limits  Problem Solving  Problem Solving: Within Functional Limits  Numeric Reasoning  Numeric Reasoning: Within Functional Limits  Abstract Reasoning  Abstract Reasoning: Within Functional Limits    Prognosis:  Speech Therapy Prognosis  Prognosis: Excellent  Prognosis Considerations: Age  Individuals consulted  Consulted and agree with results and recommendations: Patient;RN  RN Name: Ramon Orosco    Education:  Patient Education: Educated pt to purpose of visit, purpose of assessment, results/recommendations. Patient Education Response: Verbalizes understanding  Safety Devices in place: Yes  Type of devices: All fall risk precautions in place;Nurse notified    Therapy Time:  9585-1277 (19 minutes)    Arabella Madera, [id number]  Pg.  # [id number]

## 2022-09-20 NOTE — PROGRESS NOTES
NEUROLOGY / NEUROCRITICAL CARE PROGRESS NOTE       Patient Name: Liane Peña YOB: 1995   Sex: Male Age: 31 yrs     CC / Reason for Consult: seizure    Interval Hx / Changes over last 24 hours:   Patient states dizziness and photophobia improved since yesterday. Headache continues to improve. Meningitis/encephalitis panel pan-negative. ROS: C/o slight headache. HISTORY   Admission HPI:   Liane Peña is a 31 y.o. y/o male with no significant PMH. Per my interview with the patient and his significant other he had a possible seizure on 9/14. Patient's significant other has a video on where you hear a thump on the audio, and then witness the patient stumble into view and sit on the couch, shake his head and try to re-orient himself. Patient denies any memory of this event. On the morning of 9/18, patient's girlfriend he had another possible seizure with a semiology of shaking to his BUE, eyes rolling to the back of his head, and foaming at the mouth for ~5min. The patient does not remember this second event either. EMS was called and patient was [facility] ED. Patient c/o a \"clogged L ear\", dizziness, headache, and photophobia on arrival to ED. CT head showed multifocal areas of possible edema with hemorrhagic staining and a subtle L temporal fracture. MRI head w wo contrast showed hyperintensity on T2 in the anterior R frontal lobe w/out mass effect suspicious for acute inflammatory or infectious process. WBC count 15.4 in ED. Patient afebrile on arrival.  Admitted to  for further workup. PMH No past medical history on file. Allergies Allergies   Allergen Reactions    Erythromycin Anaphylaxis     Throat swells       Diet ADULT DIET;  Regular   Isolation No active isolations     CURRENT SCHEDULED MEDICATIONS   Inpatient Medications     sodium chloride flush, 5-40 mL, IntraVENous, 2 times per day    enoxaparin, 40 mg, SubCUTAneous, Daily    levETIRAcetam, 1,000 mg, IntraVENous, Q12H   Infusions    sodium chloride 20 mL/hr at 09/19/22 1357      Antibiotics   Recent Abx Admin                     cefTRIAXone (ROCEPHIN) 2,000 mg in dextrose 5 % 50 mL IVPB mini-bag (mg) 2,000 mg New Bag 09/20/22 0606     2,000 mg New Bag 09/19/22 1729    acyclovir (ZOVIRAX) 650 mg in dextrose 5 % 100 mL IVPB (mg) 650 mg New Bag 09/20/22 0120     650 mg New Bag 09/19/22 1807     650 mg New Bag  1104                      LABS   Metabolic Panel Recent Labs     09/18/22  1250 09/19/22  0606 09/20/22  0504    140 135*   K 3.8 4.3 4.0   CL 97* 99 98*   CO2 25 29 24   BUN 11 10 10   CREATININE 0.8* 0.9 0.7*   GLUCOSE 114* 95 111*   CALCIUM 9.3 9.4 9.1   LABALBU 4.7  --   --    ALKPHOS 64  --   --    ALT 29  --   --    AST 13*  --   --       CBC / Coags Recent Labs     09/18/22  1250 09/19/22  0607 09/20/22  0504   WBC 15.4* 12.6* 11.5*   RBC 5.54 5.07 5.09   HGB 16.7 15.3 15.5   HCT 48.7 44.1 44.1    312 339   INR  --  1.05  --       Other No results for input(s): LABA1C, LDLCALC, TRIG, TSH, [id number], FOLATE, LABSALI, COVID19 in the last 72 hours. Recent Labs     09/18/22  1729   LACTA 2.2*        DIAGNOSTICS   IMAGES:  No new imaging. Previous imaging  Head CT w/o Contrast:  Impression       Multifocal areas of presumed edema with linear hemorrhagic staining throughout the cerebrum. Further evaluation with contrast-enhanced MRI as warranted. ADDENDUM #1    Upon review of the CT images, there is small amount of intracranial gas    along the inner table of the left temporal bone. There is subtle fracture    associated with the squamous portion of the left temporal bone. MRI Brain w & w/o Contrast:  Impression   Impression:    T2/FLAIR hyperintensity in the right frontal lobe with associated pachymeningeal enhancement as well as subtle regions of T2 hyperintensity in the inferior bilateral temporal lobes.    Differential diagnosis is broad and favored to represent an acute inflammatory or infectious process such as cerebritis, encephalitis. Ischemic process is felt to be less likely but there is some susceptibility artifact in the region of interest that could represent some amount of cortical laminar necrosis. Neoplastic process is felt to be less likely. Routine EEG: completed, read pending      PHYSICAL EXAMINATION     PHYSICAL EXAM:  Vitals:    09/19/22 1830 09/19/22 2240 09/20/22 0302 09/20/22 0657   BP: 127/80 111/81 112/82 110/65   Pulse: 68 66 65 68   Resp: 16 16 16 16   Temp: 98.4 °F (36.9 °C) 98.2 °F (36.8 °C) 98.5 °F (36.9 °C) 98.5 °F (36.9 °C)   TempSrc: Oral Oral Oral Oral   SpO2: 98% 98% 98% 98%   Weight:       Height:             General: Alert, no distress, well-nourished  Neurologic  Mental status:   orientation to person, place, time, situation   Attention intact as able to attend well to the exam     Language fluent in conversation   Comprehension intact; follows simple commands    Cranial nerves:   CN2: Visual fields full w/o extinction on confrontational testing   CN 3,4,6: Pupils equal and reactive to light, extraocular muscles intact  CN5: Facial sensation symmetric   CN7: Face symmetric  CN8: Hearing symmetric to spoken voice  CN9: Palate elevated symmetrically  CN11: Traps full strength on shoulder shrug  CN12: Tongue midline with protrusion    Motor Exam:   R  L    Deltoid 5  5   Biceps 5 5   Triceps 5 5   Wrist extension  5 5   Interossei 5 5      R  L    Hip flexion  5  5   Hip extension  5 5   Knee flexion  5 5   Knee extension  5 5   Ankle dorsiflexion  5 5   Ankle plantar flexion  5 5       Sensory: light touch intact and symmetric in all 4 extremities.   No sensory extinction on bilateral simultaneous stimulation  Cerebellar/coordination: finger nose finger normal without ataxia  Tone: normal in all 4 extremities  Gait: up with standby assistance      OTHER SYSTEMS:  Cardiovascular: Warm, appears well perfused   Respiratory: Easy, non-labored

## 2022-09-20 NOTE — PLAN OF CARE
Problem: Pain  Goal: Verbalizes/displays adequate comfort level or baseline comfort level  Outcome: Progressing   Patient denies pain at this time. Problem: Safety - Adult  Goal: Free from fall injury  Outcome: Progressing  Flowsheets (Taken 9/20/2022 0104)  Free From Fall Injury:   Based on caregiver fall risk screen, instruct family/caregiver to ask for assistance with transferring infant if caregiver noted to have fall risk factors   Instruct family/caregiver on patient safety   Patient has remained free of falls. 2/4 bed rails up, bed locked and in lowest position, call light within reach. Patient instructed on use of call light and uses appropriately. Bed alarm on. Non-skid footwear and fall band on.

## 2022-09-20 NOTE — PLAN OF CARE
Problem: Discharge Planning  Goal: Discharge to home or other facility with appropriate resources  Outcome: Progressing  Note: Patient is able to d/c to home today. His girlfriend is able to transport him to home and he denies needs. Problem: Pain  Goal: Verbalizes/displays adequate comfort level or baseline comfort level  9/20/2022 0947 by Harish Nickerson RN  Outcome: Progressing  Note: Patient has had no complaints of pain this shift. Problem: Safety - Adult  Goal: Free from fall injury  9/20/2022 0947 by Harish Nickerson RN  Outcome: Progressing  Note: Patient is able to call with any needs or for assistance. Non-skid socks and bed/chair alarm for safety. Call light in reach.

## 2022-09-20 NOTE — DISCHARGE SUMMARY
INTERNAL MEDICINE DEPARTMENT AT [address] [place] [address]  DISCHARGE SUMMARY    Patient ID: Soo Deng                                             Discharge Date: 9/20/2022   Patient's PCP: [address], DO                                          Discharge Physician: Karissa Mcintyre MD MD  Admit Date: 9/18/2022   Admitting Physician: Melissa Weems MD    PROBLEMS DURING HOSPITALIZATION:  Present on Admission:   Seizure [facility])      DISCHARGE DIAGNOSES: Seizure    HPI: Soo Deng is a 71-year-old male with no relevant past medical history presented with a new onset seizure yesterday morning. Patient states that he was lying down in his bed and having conversation with his girlfriend and son mid conversation patient's eyes rolled back into his head and he started foaming at the mouth. He was making arm movements if he was grabbing for water bottle. This episode lasted about 2 to 3 minutes. Patient describes postictal state is sleepy however he could hear his girlfriend calling 911. He was able to get out of bed get dressed and get on the ambulance. Recently, patient states that he has had some sinus congestion, headache, left ear pain over the last 5 days associated dizziness and photophobia. He had poor p.o. intake over the last week, however he is drinking broth and soups and lots of water. He has been taking Tylenol and [facility] brand acetaminophen over the last 3 days. Additionally he bought lavender essential oil and eardrops on [employer] which she has used since 2 days ago. Patient also describes an incident which could be a possible seizure 4 days ago during which she was sleeping on the couch and his girlfriend heard a loud sound and found the patient still sleeping but with blood under his nose. He does not member this episode. Denies fever, chills, sweats, vision changes, sore throat, chest pain, shortness of breath, abdominal pain, nausea, vomiting, or peripheral edema. ED Course:   In the ED, patient was worked up procedure. He was given Decadron 4 mg 1 time and was given Keppra 1.5 g twice daily. CT head significant for multifocal areas of presumed edema with linear hemorrhagic staining throughout the cerebrum. Neurosurgery was consulted and recommended an MRI of his head with and without contrast.  This study notable for significant hyperintensity in the right frontal lobe with associated pachymeningeal enhancement as well as subtle regions of T2 hyperintensity of the inferior bilateral temporal lobes. These results are likely acute inflammatory infectious process. Terry Coma Scale in the ED was 15. White blood cell abated 15. Glucose at 125. Labs otherwise unremarkable. The following issues were addressed during hospitalization:    New-Onset seizures:  Patient was noted to have had 1 definite seizure at home. Presented in a postictal state having urinary incontinence. Patient stated that he could have had a previous seizure for 5 days prior but is unsure. Of note, imaging in the emergency department of a CT head showed bilateral lower temporal lobe hyperintensity on T2 flair. This was concerning for potential temporal lobe meningitis versus encephalitis. While inpatient, the patient received a loading dose and then maintenance doses of levetiracetam as seizure prophylaxis. Patient was also empirically started on acyclovir and ceftriaxone for potential CSF infections. Lumbar puncture was completed and an entire meningitis/encephalitis panel was sent out. That panel came back as a pan negative panel. CSF studies including cell count, WBCs, glucose, culture all normal at this time. Both neurology and neurosurgery were consulted for this patient. Neurosurgery indicated that there would be no surgical intervention warranted during this admission. Neurology manage the patient's lumbar puncture and signed off when the encephalitis/meningitis panel came back negative.   Patient will be discharged for an outpatient neurology follow-up. Patient will be asked to continue Keppra 1 g twice daily. Patient will also be asked not to drive a car for the next 3 months. Patient expressed understanding the plan for follow up and driving restrictions. Physical Exam:  /65   Pulse 68   Temp 98.5 °F (36.9 °C) (Oral)   Resp 16   Ht 5' 7.01\" (1.702 m)   Wt 161 lb (73 kg)   SpO2 98%   BMI 25.21 kg/m²       Consults: Neurology and neurosurgery  Significant Diagnostic Studies:  CXR and CT head  Treatments: antibiotics: ceftriaxone and acyclovir  Disposition: home  Discharged Condition: Stable  Follow Up: Primary Care Physician in two weeks. Neurology as an outpatient. DISCHARGE MEDICATION:     Medication List        START taking these medications      levETIRAcetam 500 MG tablet  Commonly known as: Keppra  Take 2 tablets by mouth 2 times daily               Where to Get Your Medications        These medications were sent to [place], [facility] [address] [address] [address] Pamela Bautista [phone] - F [phone]  [address] [facility] [address]      Phone: [phone]   levETIRAcetam 500 MG tablet       Activity: activity as tolerated  Diet: regular diet  Wound Care: none needed    Time Spent on discharge is more than 45 minutes    Radha Sanchez MD  PGY1, Internal Medicine  09/20/22  10:50 AM

## 2022-09-20 NOTE — PROGRESS NOTES
Occupational Therapy  Facility/Department: [facility] 5T ORTHO/NEURO  Occupational Therapy Initial Assessment, Tx, and d/c    Name: Cathy Tom  : 1995  MRN: [id number]  Date of Service: 2022    Discharge Recommendations: Cathy Tom scored a 24/24 on the AM-PAC ADL Inpatient form. Current research shows that an AM-PAC score of 18 or greater is typically associated with a discharge to the patient's home setting. If patient discharges prior to next session this note will serve as a discharge summary. Please see below for the latest assessment towards goals. Patient Diagnosis(es): The encounter diagnosis was Seizure [facility]). Past Medical History:  has no past medical history on file. Past Surgical History:  has no past surgical history on file. Treatment Diagnosis: decreaed functional mob      Assessment   Performance deficits / Impairments: Decreased functional mobility   Assessment: Pt from home with girlfriend and son. Admitted for seizure. Pt reports being independent w/ functional mob and ADL/IADL's. Pt currently requiring Mod I for all functional mobility and ADL's. Pt's gf works from home and able to provide 24hA if needed at d/c. OT recommending home w/  24h at d/c. Pt expresses no concerns for return to home or acute OT needs, will sign off.   Treatment Diagnosis: decreaed functional mob  REQUIRES OT FOLLOW-UP: No  Activity Tolerance  Activity Tolerance: Patient Tolerated treatment well            Restrictions  Restrictions/Precautions  Restrictions/Precautions: Seizure  Position Activity Restriction  Other position/activity restrictions: up with assist    Subjective   General  Chart Reviewed: Yes  Patient assessed for rehabilitation services?: Yes  Additional Pertinent Hx: 24yo M with no relevant PMHx who presents with new onset seizure this morning ()  Family / Caregiver Present: No  Referring Practitioner: DELIA Russell CNP  Diagnosis: seizure  Subjective  Subjective: supine in bed, agreeable to therapy. reports no pain. \"can we keep the lights off? It makes my head hurt\"     Social/Functional History  Social/Functional History  Lives With: Significant other, Son (7 mos son)  Type of Home: House  Home Layout: Multi-level, Laundry in basement  Home Access: Stairs to enter with rails  Entrance Stairs - Number of Steps: 4  Bathroom Shower/Tub: Walk-in shower  Bathroom Toilet: Standard  Home Equipment:  (none)  Has the patient had two or more falls in the past year or any fall with injury in the past year?: Yes (fell out of a \"golf cart type of thing and smacked the left side of my body\")  ADL Assistance: [address] [place] [address] Avenue: Independent  Homemaking Responsibilities: No  Ambulation Assistance: Independent  Transfer Assistance: Independent  Active : Yes  Mode of Transportation: Car  Additional Comments: girlfriend works from, able to be home at d/c          Safety Devices  Type of Devices: Left in chair;Nurse notified; Chair alarm in place;Call light within reach  Restraints  Restraints Initially in Place: No  Bed Mobility Training  Bed Mobility Training: Yes  Overall Level of Assistance: Modified independent  Supine to Sit: Modified independent  Balance  Sitting: Intact  Standing: Intact  Transfer Training  Transfer Training: Yes  Overall Level of Assistance: Modified independent  Sit to Stand: Modified independent  Stand to Sit: Modified independent  Gait  Overall Level of Assistance: Modified independent (walked from bed to bathroom to chair. intentional with movements, steady on feet.)  Wheelchair Management  Wheelchair Management: No     AROM: Within functional limits  Strength:  Within functional limits  ADL  Grooming: Modified independent   Grooming Skilled Clinical Factors: stood at sink to complete oral care and wipe UB  UE Bathing: Modified independent   UE Bathing Skilled Clinical Factors: stood at sink to wipe UB  LE Bathing: Modified independent   LE Bathing Skilled Clinical Factors: stood at sink to wipe nancy area  Toileting: Modified independent   Toileting Skilled Clinical Factors: stood at toilet              Vision  Vision: Within Functional Limits  Hearing  Hearing: Within functional limits  Orientation  Overall Orientation Status: Within Functional Limits                  Education Given To: Patient  Education Provided: Role of Therapy;IADL Safety;Plan of Care  Education Method: Demonstration;Verbal  Barriers to Learning: None  Education Outcome: Verbalized understanding;Demonstrated understanding                     AM-PAC Score        AM-[facility] Inpatient Daily Activity Raw Score: 24 (09/20/22 1103)  AM-PAC Inpatient ADL T-Scale Score : 57.54 (09/20/22 1103)  ADL Inpatient CMS 0-100% Score: 0 (09/20/22 1103)  ADL Inpatient CMS G-Code Modifier : 509 [address] (09/20/22 1103)           Therapy Time   Individual Concurrent Group Co-treatment   Time In 0824         Time Out 0902         Minutes 38          Timed Code Treatment Minutes:   23    Total Treatment Minutes:  Guillermina 939, OTS    Therapist was present, directed the patients care, made skilled judgement and was responsible for assessment and treatment of the patient.      Bruce Medel, VETO, OTR/L

## 2022-09-20 NOTE — PROGRESS NOTES
Internal Medicine Progress Note    Admit Date: 9/18/2022  Day: 2   Diet: ADULT DIET; Regular    CC: Seizures    Interval history: Patient interviewed at bedside today. No acute events overnight. Patient endorses mild headache this AM, but otherwise no fevers, chills, or seizure-like activity over night. Vitals appreciated -stable. Telemetry appreciated, normal sinus rhythm. Patient had continuous EEG monitor yesterday but it was discontinued early this morning due to no ongoing seizure activity. HPI:  Sania Muro is a 58-year-old male with no relevant past medical history presented with a new onset seizure yesterday morning. Patient states that he was lying down in his bed and having conversation with his girlfriend and son mid conversation patient's eyes rolled back into his head and he started foaming at the mouth. He was making arm movements if he was grabbing for water bottle. This episode lasted about 2 to 3 minutes. Patient describes postictal state is sleepy however he could hear his girlfriend calling 911. He was able to get out of bed get dressed and get on the ambulance. Recently, patient states that he has had some sinus congestion, headache, left ear pain over the last 5 days associated dizziness and photophobia. He had poor p.o. intake over the last week, however he is drinking broth and soups and lots of water. He has been taking Tylenol and [facility] brand acetaminophen over the last 3 days. Additionally he bought lavender essential oil and eardrops on [place] which she has used since 2 days ago. Patient also describes an incident which could be a possible seizure 4 days ago during which she was sleeping on the couch and his girlfriend heard a loud sound and found the patient still sleeping but with blood under his nose. He does not member this episode.   Denies fever, chills, sweats, vision changes, sore throat, chest pain, shortness of breath, abdominal pain, nausea, vomiting, or peripheral edema. ED Course: In the ED, patient was worked up procedure. He was given Decadron 4 mg 1 time and was given Keppra 1.5 g twice daily. CT head significant for multifocal areas of presumed edema with linear hemorrhagic staining throughout the cerebrum. Neurosurgery was consulted and recommended an MRI of his head with and without contrast.  This study notable for significant hyperintensity in the right frontal lobe with associated pachymeningeal enhancement as well as subtle regions of T2 hyperintensity of the inferior bilateral temporal lobes. These results are likely acute inflammatory infectious process. Terry Coma Scale in the ED was 15. White blood cell abated 15. Glucose at 125. Labs otherwise unremarkable. Medications:     Scheduled Meds:   sodium chloride flush  5-40 mL IntraVENous 2 times per day    enoxaparin  40 mg SubCUTAneous Daily    levETIRAcetam  1,000 mg IntraVENous Q12H     Continuous Infusions:   sodium chloride 20 mL/hr at 09/19/22 1357     PRN Meds:sodium chloride flush, sodium chloride, ondansetron **OR** ondansetron, polyethylene glycol, acetaminophen **OR** acetaminophen    Objective:   Vitals:   T-max:  Patient Vitals for the past 8 hrs:   BP Temp Temp src Pulse Resp SpO2   09/20/22 0657 110/65 98.5 °F (36.9 °C) Oral 68 16 98 %   09/20/22 0302 112/82 98.5 °F (36.9 °C) Oral 65 16 98 %         Intake/Output Summary (Last 24 hours) at 9/20/2022 0828  Last data filed at 9/20/2022 0353  Gross per 24 hour   Intake 720 ml   Output --   Net 720 ml         Physical Exam  Constitutional:       General: He is awake. He is not in acute distress. Appearance: He is normal weight. Eyes:      Extraocular Movements: Extraocular movements intact. Cardiovascular:      Rate and Rhythm: Normal rate and regular rhythm. Heart sounds: Normal heart sounds, S1 normal and S2 normal. No murmur heard.   Pulmonary:      Effort: Pulmonary effort is normal.      Breath sounds: Normal breath sounds and air entry. Abdominal:      General: Abdomen is flat. Palpations: Abdomen is soft. Tenderness: There is no abdominal tenderness. Musculoskeletal:      Right lower leg: No edema. Left lower leg: No edema. Skin:     General: Skin is warm and dry. Coloration: Skin is not jaundiced or pale. Neurological:      Mental Status: He is alert and oriented to person, place, and time. Psychiatric:         Mood and Affect: Mood normal.         Behavior: Behavior normal. Behavior is cooperative. LABS:    CBC:   Recent Labs     09/18/22  1250 09/19/22  0607 09/20/22  0504   WBC 15.4* 12.6* 11.5*   HGB 16.7 15.3 15.5   HCT 48.7 44.1 44.1    312 339   MCV 87.8 87.0 86.7       Renal:    Recent Labs     09/18/22  1250 09/19/22  0606 09/20/22  0504    140 135*   K 3.8 4.3 4.0   CL 97* 99 98*   CO2 25 29 24   BUN 11 10 10   CREATININE 0.8* 0.9 0.7*   GLUCOSE 114* 95 111*   CALCIUM 9.3 9.4 9.1   ANIONGAP 15 12 13       Hepatic:   Recent Labs     09/18/22  1250   AST 13*   ALT 29   BILITOT 0.6   PROT 7.7   LABALBU 4.7   ALKPHOS 64       Troponin: No results for input(s): TROPONINI in the last 72 hours. BNP: No results for input(s): BNP in the last 72 hours. Lipids: No results for input(s): CHOL, HDL in the last 72 hours. Invalid input(s): LDLCALCU, TRIGLYCERIDE  ABGs:  No results for input(s): PHART, OWK6RGW, PO2ART, WQN8QFU, BEART, THGBART, O1PEFUKS, KXV9VYT in the last 72 hours. INR:   Recent Labs     09/19/22  0607   INR 1.05       Lactate: No results for input(s): LACTATE in the last 72 hours. Cultures:  -----------------------------------------------------------------  RAD:   MRI BRAIN W WO CONTRAST   Final Result   Addendum (preliminary) 1 of 1   ADDENDUM #1    Upon review of the CT images, there is small amount of intracranial gas    along the inner table of the left temporal bone.  There is subtle fracture    associated with the squamous portion of the left temporal bone. Up-to-date findings discussed with Dr. Westley Figueredo in the emergency room on    9/18/2022 at [id number]. Final   Impression:       T2/FLAIR hyperintensity in the right frontal lobe with associated pachymeningeal enhancement as well as subtle regions of T2 hyperintensity in the inferior bilateral temporal lobes. Differential diagnosis is broad and favored to represent an acute inflammatory or infectious process such as cerebritis, encephalitis. Ischemic process is felt to be less likely but there is some susceptibility artifact in the region of interest that could represent some amount of cortical laminar necrosis. Neoplastic process is felt to be less likely. CT Head W/O Contrast   Final Result      Multifocal areas of presumed edema with linear hemorrhagic staining throughout the cerebrum. Further evaluation with contrast-enhanced MRI as warranted. Findings discussed with Dr. Westley Figueredo in the emergency room on 9/18/2022 at [phone]. FL LUMBAR PUNCTURE DIAG    (Results Pending)         Assessment/Plan:   Jada Fam is a 59-year-old male with no pertinent PMH who presents to the ED with new onset seizures and imaging showing hyperintensity of the inferior bilateral temporal lobes concerning for infection versus inflammatory neurological process. New- Onset Seizures:  Meningitis/Encephalitis Panel Pan-negative. OK to DC ceftriaxone and acyclovir, per neurology. Follow-up with neurology as outpatient. Driving restriction for 3 months d/t seizure. OK to discharge from a neuro perspective. 1g Keppra twice daily PO after discharge. While still inpatient, q4hr neuro checks  LP with CSF studies- IR completed study, significant for protein 53, 1700 RBC, 26 WBC  Neurosurgery consulted-no neurosurgical intervention indicated. Urinalysis significant for ketones >80 and urobilinogen 2.0, otherwise negative for leukocyte esterase. Urine culture not indicated.   Trend CK daily x3 days. Most recent was 96, trending downward from admission. Pain control with Tylenol as needed  UDS pending-will follow up on results  Decadron 4mg x1 was given in the ED. Not repeated since then. Code Status:Full Code  FEN: ADULT DIET;  Regular  PPX:  Lovenox  DISPO: OK to discharge home with neurology follow-up    Alexandria Castillo, MS3, as ricky Rob MD  PGY1, Internal Medicine  09/20/22  8:28 AM      This patient will be staffed and discussed with Jose Cruz Gomez MD.

## 2022-09-20 NOTE — PROGRESS NOTES
Physical Therapy  Facility/Department: [facility] 5T ORTHO/NEURO  Physical Therapy Initial Assessment and Treatment/Discharge    Name: Soo Deng  : 1995  MRN: [id number]  Date of Service: 2022    Discharge Recommendations:    Soo Deng scored a 24/24 on the AM-PAC short mobility form. At this time, no further PT is recommended upon discharge due to pt independent  Recommend patient returns to prior setting with prior services. PT Equipment Recommendations  Equipment Needed: No      Patient Diagnosis(es): The encounter diagnosis was Seizure ([address] Past Medical History:  has no past medical history on file. Past Surgical History:  has no past surgical history on file. Assessment   Assessment: Pt demonstrating all mobility independently. No deficits noted with static or dynamic balance. Pt plans to return home and has no concerns about managing. No PT needs identified. Decision Making: Low Complexity  Requires PT Follow-Up: No     Plan   Plan  Plan: Discharge with evaluation only  Safety Devices  Type of Devices: Left in chair, Nurse notified, Chair alarm in place, Call light within reach  Restraints  Restraints Initially in Place: No     Restrictions  Restrictions/Precautions  Restrictions/Precautions: Seizure  Position Activity Restriction  Other position/activity restrictions: up with assist     Subjective   General  Chart Reviewed: Yes  Patient assessed for rehabilitation services?: Yes  Additional Pertinent Hx: Pt is a 31 y.o. male adm  - presented to the hospital for evaluation of a seizure. Head CT:Multifocal areas of presumed edema with linear hemorrhagic staining throughout the cerebrum.    MRI brain:T2/FLAIR hyperintensity in the right frontal lobe with associated pachymeningeal enhancement as well as subtle regions of T2 hyperintensity in the inferior bilateral temporal lobes, Differential diagnosis is broad and favored to represent an acute inflammatory or infectious process such as cerebritis, encephalitis. LP 9/19. PMH: none significant  Referring Practitioner: DELIA Camejo CNP  Referral Date : 09/19/22  Subjective  Subjective: Pt found supine. Agreeable to PT. Denies pain. \"I just have to take my time getting up. \"         Social/Functional History  Social/Functional History  Lives With: Significant other, Son (7 mos son)  Type of Home: House  Home Layout: Multi-level, Laundry in basement  Home Access: Stairs to enter with rails  Entrance Stairs - Number of Steps: 4  Bathroom Shower/Tub: Walk-in shower  Bathroom Toilet: Standard  Home Equipment:  (none)  Has the patient had two or more falls in the past year or any fall with injury in the past year?: Yes (fell out of a \"golf cart type of thing and smacked the left side of my body\")  ADL Assistance: [address] [place] [address] Avenue: Independent  Homemaking Responsibilities: No  Ambulation Assistance: Independent  Transfer Assistance: Independent  Active : Yes  Mode of Transportation: Car  Additional Comments: girlfriend works from, able to be home at d/c  Vision/Hearing  Vision  Vision: Within Functional Limits  Hearing  Hearing: Within functional limits    Cognition   Orientation  Overall Orientation Status: Within Functional Limits     Objective                 AROM RLE (degrees)  RLE AROM: WFL  AROM LLE (degrees)  LLE AROM : WFL  Strength RLE  Strength RLE:  (5/5 grossly)  Strength LLE  Strength LLE:  (5/5 grossly)          Bed mobility  Supine to Sit: Modified independent  Transfers  Sit to Stand: Independent  Stand to sit: Independent  Ambulation  Device: No Device  Assistance: Independent  Quality of Gait: steady gt, no LOB, good stephen  Distance: 10', 20'  Stairs/Curb  Stairs?:  (declined need to practice steps)     Balance  Comments: Static balance activities:  Rhomberg with eyes open and eyes closed x 30 sec ea;  Sharpened Rhomberg with each foot forward with eyes open and closed x >20 sec each; SLS eyes open >20 sec each LE - all performed with SB/supervision. Dynamic balance activities: sidestepping, braiding, tandem walking, marching with ambulation - all performed with SB/supervision.        Treatment included: bed mobility, transfers, gt, pt education      OutComes Score                                                  AM-PAC Score  AM-PAC Inpatient Mobility Raw Score : 24 (09/20/22 0843)  AM-PAC Inpatient T-Scale Score : 61.14 (09/20/22 0843)  Mobility Inpatient CMS 0-100% Score: 0 (09/20/22 0843)  Mobility Inpatient CMS G-Code Modifier : [address] (09/20/22 8719)          Tinneti Score       Goals  Short Term Goals  Time Frame for Short term goals: No goals set - pt independent       Education  Patient Education  Education Given To: Patient  Education Provided: Role of Therapy  Education Provided Comments: activity at home  Education Method: Verbal  Education Outcome: Verbalized understanding      Therapy Time   Individual Concurrent Group Co-treatment   Time In 0824         Time Out 0902         Minutes 38               Timed Code Treatment Minutes: 23      Total Treatment Minutes:  [address], PT

## 2022-09-20 NOTE — PROGRESS NOTES
Patient is alert and oriented x4, VSS. Neuro checks WNL. No seizure activity thus far. EEG test completed. Tolerating diet and ambulation. Seizure and fall precautions in place.

## 2022-09-22 LAB
BLOOD CULTURE, ROUTINE: NORMAL
CULTURE, BLOOD 2: NORMAL

## 2022-09-26 LAB
CSF CULTURE: NORMAL
GRAM STAIN RESULT: NORMAL

## 2022-09-28 ENCOUNTER — OFFICE VISIT (OUTPATIENT)
Dept: PRIMARY CARE CLINIC | Age: 27
End: 2022-09-28

## 2022-09-28 VITALS
HEIGHT: 67 IN | HEART RATE: 90 BPM | SYSTOLIC BLOOD PRESSURE: 122 MMHG | DIASTOLIC BLOOD PRESSURE: 85 MMHG | TEMPERATURE: 97.4 F | WEIGHT: 162 LBS | BODY MASS INDEX: 25.43 KG/M2 | OXYGEN SATURATION: 98 %

## 2022-09-28 DIAGNOSIS — S02.19XD CLOSED FRACTURE OF TEMPORAL BONE WITH ROUTINE HEALING, SUBSEQUENT ENCOUNTER: ICD-10-CM

## 2022-09-28 DIAGNOSIS — R56.9 SEIZURE (HCC): Primary | ICD-10-CM

## 2022-09-28 PROBLEM — S02.19XA CLOSED FRACTURE OF TEMPORAL BONE (HCC): Status: ACTIVE | Noted: 2022-09-28

## 2022-09-28 PROCEDURE — 99214 OFFICE O/P EST MOD 30 MIN: CPT | Performed by: FAMILY MEDICINE

## 2022-09-28 SDOH — ECONOMIC STABILITY: FOOD INSECURITY: WITHIN THE PAST 12 MONTHS, YOU WORRIED THAT YOUR FOOD WOULD RUN OUT BEFORE YOU GOT MONEY TO BUY MORE.: NEVER TRUE

## 2022-09-28 SDOH — ECONOMIC STABILITY: FOOD INSECURITY: WITHIN THE PAST 12 MONTHS, THE FOOD YOU BOUGHT JUST DIDN'T LAST AND YOU DIDN'T HAVE MONEY TO GET MORE.: NEVER TRUE

## 2022-09-28 ASSESSMENT — SOCIAL DETERMINANTS OF HEALTH (SDOH): HOW HARD IS IT FOR YOU TO PAY FOR THE VERY BASICS LIKE FOOD, HOUSING, MEDICAL CARE, AND HEATING?: NOT HARD AT ALL

## 2022-09-28 NOTE — PROGRESS NOTES
[address] [facility] [address] PRIMARY CARE  [address] [facility] [address]  Dept: [phone]  Dept Fax: [phone]     9/28/2022      Abiodun Ryan   1995     Chief Complaint   Patient presents with    Follow-up     K follow up. Patient was in hospital for seizure. Patient possibly had a seizure 4 days before hospital according to his girlfriend. HPI    Pt comes in today for hospital follow up. Admitted 9/18/22 - 9/20/22. Dx: New onset seizure    Girlfriend witnessed the event on 9/18/22. Similar but unwitnessed event ~ 4 days prior where girlfriend found him on the ground with blood under his nose. Presented to ER in post ictal state with noted urinary incontinence. CT head significant for multifocal areas of presumed edema with linear hemorrhagic staining throughout the cerebrum. Neurosurgery was consulted and recommended an MRI of his head with and without contrast.  This study notable for significant hyperintensity in the right frontal lobe with associated pachymeningeal enhancement as well as subtle regions of T2 hyperintensity of the inferior bilateral temporal lobes. These results are likely acute inflammatory infectious process. While inpatient, the patient received a loading dose and then maintenance doses of levetiracetam as seizure prophylaxis. Patient was also empirically started on acyclovir and ceftriaxone for potential CSF infections. Lumbar puncture was completed and an entire meningitis/encephalitis panel was sent out. That panel came back as a pan negative panel. CSF studies including cell count, WBCs, glucose, culture all normal at this time. Both neurology and neurosurgery were consulted for this patient. Neurosurgery indicated that there would be no surgical intervention warranted during this admission. Neurology manage the patient's lumbar puncture and signed off when the encephalitis/meningitis panel came back negative. Patient will be discharged for an outpatient neurology follow-up. Patient will be asked to continue Keppra 1 g twice daily. Patient will also be asked not to drive a car for the next 3 months. Patient expressed understanding the plan for follow up and driving restrictions. Upon review of the CT images, there is small amount of intracranial gas    along the inner table of the left temporal bone. There is subtle fracture    associated with the squamous portion of the left temporal bone. Patient has a follow up appt scheduled with Dr. Shyam Hercules in 6 weeks. Currently, feeling well. No further episodes. Per neurology, they did note a skull fracture as well at the time of presentation with possible cause as skull fracture when he had the first episode (syncope?) followed with inflammation with resulting seizure 4 days later. Notes he was donating plasma twice a month prior to this episode. EEG normal.     Prior to Visit Medications    Medication Sig Taking? Authorizing Provider   levETIRAcetam (KEPPRA) 500 MG tablet Take 2 tablets by mouth 2 times daily Yes Freida Okeefe MD        No past medical history on file. Social History     Tobacco Use    Smoking status: Every Day     Packs/day: 0.25     Types: Cigars, Cigarettes    Smokeless tobacco: Never   Substance Use Topics    Drug use: Yes     Types: Marijuana Herb [place])     Comment: occ        No past surgical history on file. Allergies   Allergen Reactions    Erythromycin Anaphylaxis     Throat swells         No family history on file. Patient's past medical history, surgical history, family history, medications, and allergies  were all reviewed and updated as appropriate today. Review of Systems   Constitutional:  Negative for fever. Neurological:  Negative for dizziness, tremors, seizures, syncope, weakness and headaches. Psychiatric/Behavioral:  Negative for confusion.       /85   Pulse 90   Temp 97.4 °F (36.3 °C)   Ht 5' 7\" (1.702 m) Wt 162 lb (73.5 kg)   SpO2 98%   BMI 25.37 kg/m²      Physical Exam  Vitals reviewed. Constitutional:       General: He is not in acute distress. Appearance: He is well-developed. HENT:      Right Ear: Tympanic membrane normal.      Left Ear: Tympanic membrane normal.   Eyes:      General: No scleral icterus. Extraocular Movements: Extraocular movements intact. Conjunctiva/sclera: Conjunctivae normal.      Pupils: Pupils are equal, round, and reactive to light. Neck:      Thyroid: No thyromegaly. Cardiovascular:      Rate and Rhythm: Normal rate and regular rhythm. Heart sounds: No murmur heard. Pulmonary:      Effort: Pulmonary effort is normal. No respiratory distress. Breath sounds: Normal breath sounds. Abdominal:      General: There is no distension. Palpations: Abdomen is soft. Tenderness: There is no abdominal tenderness. Musculoskeletal:      Cervical back: Neck supple. Right lower leg: No edema. Left lower leg: No edema. Lymphadenopathy:      Cervical: No cervical adenopathy. Skin:     General: Skin is warm and dry. Capillary Refill: Capillary refill takes less than 2 seconds. Neurological:      General: No focal deficit present. Mental Status: He is alert and oriented to person, place, and time. Mental status is at baseline. Cranial Nerves: No cranial nerve deficit. Psychiatric:         Mood and Affect: Mood normal.       Assessment:  Encounter Diagnoses   Name Primary? Seizure ([address] Yes    Closed fracture of temporal bone with routine healing, subsequent encounter        Plan:    - Based off of the information provided at the visit today, it seems he may have had a syncopal episode with skull fracture resulting in inflammation and subsequent seizure. No further issues since discharge. Compliant with keppra and driving restrictions. EEG normal. Will touch base with patient after his follow up neuro appt.      New Prescriptions    No medications on file        No orders of the defined types were placed in this encounter. Return in about 3 months (around 12/28/2022) for Follow up seizure.     Total time spent with patient including direct consultation, evaluation, review of medical records and documentation = 343 Lucille Noble, DO

## 2024-10-14 ENCOUNTER — OFFICE VISIT (OUTPATIENT)
Dept: PRIMARY CARE CLINIC | Age: 29
End: 2024-10-14
Payer: COMMERCIAL

## 2024-10-14 VITALS
OXYGEN SATURATION: 98 % | HEIGHT: 67 IN | TEMPERATURE: 97.8 F | HEART RATE: 69 BPM | SYSTOLIC BLOOD PRESSURE: 105 MMHG | WEIGHT: 161.2 LBS | BODY MASS INDEX: 25.3 KG/M2 | DIASTOLIC BLOOD PRESSURE: 68 MMHG

## 2024-10-14 DIAGNOSIS — Z11.3 SCREENING FOR STDS (SEXUALLY TRANSMITTED DISEASES): ICD-10-CM

## 2024-10-14 DIAGNOSIS — N50.89 MASS OF LEFT TESTIS: Primary | ICD-10-CM

## 2024-10-14 PROBLEM — Z87.898 HISTORY OF SEIZURE: Status: ACTIVE | Noted: 2022-09-18

## 2024-10-14 PROCEDURE — G8427 DOCREV CUR MEDS BY ELIG CLIN: HCPCS | Performed by: FAMILY MEDICINE

## 2024-10-14 PROCEDURE — 99214 OFFICE O/P EST MOD 30 MIN: CPT | Performed by: FAMILY MEDICINE

## 2024-10-14 PROCEDURE — G8484 FLU IMMUNIZE NO ADMIN: HCPCS | Performed by: FAMILY MEDICINE

## 2024-10-14 PROCEDURE — 4004F PT TOBACCO SCREEN RCVD TLK: CPT | Performed by: FAMILY MEDICINE

## 2024-10-14 PROCEDURE — G8419 CALC BMI OUT NRM PARAM NOF/U: HCPCS | Performed by: FAMILY MEDICINE

## 2024-10-14 SDOH — ECONOMIC STABILITY: INCOME INSECURITY: HOW HARD IS IT FOR YOU TO PAY FOR THE VERY BASICS LIKE FOOD, HOUSING, MEDICAL CARE, AND HEATING?: SOMEWHAT HARD

## 2024-10-14 SDOH — ECONOMIC STABILITY: FOOD INSECURITY: WITHIN THE PAST 12 MONTHS, THE FOOD YOU BOUGHT JUST DIDN'T LAST AND YOU DIDN'T HAVE MONEY TO GET MORE.: NEVER TRUE

## 2024-10-14 SDOH — ECONOMIC STABILITY: FOOD INSECURITY: WITHIN THE PAST 12 MONTHS, YOU WORRIED THAT YOUR FOOD WOULD RUN OUT BEFORE YOU GOT MONEY TO BUY MORE.: NEVER TRUE

## 2024-10-14 ASSESSMENT — PATIENT HEALTH QUESTIONNAIRE - PHQ9
SUM OF ALL RESPONSES TO PHQ QUESTIONS 1-9: 0
SUM OF ALL RESPONSES TO PHQ QUESTIONS 1-9: 0
1. LITTLE INTEREST OR PLEASURE IN DOING THINGS: NOT AT ALL
2. FEELING DOWN, DEPRESSED OR HOPELESS: NOT AT ALL
SUM OF ALL RESPONSES TO PHQ9 QUESTIONS 1 & 2: 0
SUM OF ALL RESPONSES TO PHQ QUESTIONS 1-9: 0
SUM OF ALL RESPONSES TO PHQ QUESTIONS 1-9: 0

## 2024-10-14 NOTE — PROGRESS NOTES
[facility]  [facility] PRIMARY CARE  [address] [place], [place] [id number]  Dept: [phone]  Dept Fax: [phone]     10/14/2024      Esteban Barber   1995     Chief Complaint   Patient presents with    Testicle Pain      Left Testicle pain       HPI  Pt, routinely seen by Dr Evelyne Pollack, comes in today for concerns of:    L TESTICLE LUMP: First noticed 4-5 days ago. There was some mild aching and then he felt like there was a new lump. No change since then. No other  symptoms. He is sexually active, unprotected a couple of months ago.         10/14/2024    11:42 AM 9/24/2021    12:34 PM   PHQ Scores   PHQ2 Score 0 0   PHQ9 Score 0 0     Interpretation of Total Score Depression Severity: 1-4 = Minimal depression, 5-9 = Mild depression, 10-14 = Moderate depression, 15-19 = Moderately severe depression, 20-27 = Severe depression     Prior to Visit Medications    Not on File       Past Medical History:   Diagnosis Date    History of seizure - single event 09/2022 09/18/2022        Social History     Tobacco Use    Smoking status: Every Day     Current packs/day: 0.25     Types: Cigars, Cigarettes    Smokeless tobacco: Never   Substance Use Topics    Drug use: Yes     Types: Marijuana (Weed)     Comment: occ        History reviewed. No pertinent surgical history.     Allergies   Allergen Reactions    Erythromycin Anaphylaxis     Throat swells         Family History   Problem Relation Age of Onset    Hypertension Other     Diabetes Other         Patient's past medical history, surgical history, family history, medications, and allergies  were all reviewed and updated as appropriate today.    Review of Systems   Constitutional:  Negative for fever.   HENT:  Negative for ear pain and sore throat.    Respiratory:  Negative for cough and shortness of breath.    Cardiovascular:  Negative for chest pain.   Gastrointestinal:  Negative for abdominal pain and nausea.   Genitourinary:

## 2024-10-15 LAB
C TRACH DNA UR QL NAA+PROBE: NEGATIVE
N GONORRHOEA DNA UR QL NAA+PROBE: NEGATIVE
SPECIMEN TYPE: NORMAL
TRICHOMONAS VAGINALIS SCREEN: NEGATIVE

## 2024-10-15 ASSESSMENT — ENCOUNTER SYMPTOMS
SORE THROAT: 0
ABDOMINAL PAIN: 0
COUGH: 0
SHORTNESS OF BREATH: 0
NAUSEA: 0

## 2024-10-15 NOTE — RESULT ENCOUNTER NOTE
Pat Orellana, urine testing negative. Will be out of the office at 2 today, but will try to take a look at the US first thing tomorrow morning and let you know what it shows.

## 2024-10-17 ENCOUNTER — HOSPITAL ENCOUNTER (OUTPATIENT)
Dept: ULTRASOUND IMAGING | Age: 29
Discharge: HOME | End: 2024-10-17
Attending: FAMILY MEDICINE
Payer: COMMERCIAL

## 2024-10-17 DIAGNOSIS — N50.89 MASS OF LEFT TESTIS: ICD-10-CM

## 2024-10-17 PROCEDURE — 93975 VASCULAR STUDY: CPT

## 2024-10-17 PROCEDURE — 76870 US EXAM SCROTUM: CPT
